# Patient Record
Sex: FEMALE | Race: WHITE | NOT HISPANIC OR LATINO | Employment: STUDENT | URBAN - METROPOLITAN AREA
[De-identification: names, ages, dates, MRNs, and addresses within clinical notes are randomized per-mention and may not be internally consistent; named-entity substitution may affect disease eponyms.]

---

## 2018-12-18 ENCOUNTER — TRANSFERRED RECORDS (OUTPATIENT)
Dept: HEALTH INFORMATION MANAGEMENT | Facility: CLINIC | Age: 23
End: 2018-12-18

## 2019-09-20 ENCOUNTER — TRANSFERRED RECORDS (OUTPATIENT)
Dept: HEALTH INFORMATION MANAGEMENT | Facility: CLINIC | Age: 24
End: 2019-09-20

## 2020-11-05 NOTE — PROGRESS NOTES
"Answers for HPI/ROS submitted by the patient on 11/6/2020   Annual Exam:  Frequency of exercise:: 2-3 days/week  Getting at least 3 servings of Calcium per day:: Yes  Diet:: Regular (no restrictions)  Taking medications regularly:: Yes  Medication side effects:: None  Bi-annual eye exam:: NO  Dental care twice a year:: NO  Sleep apnea or symptoms of sleep apnea:: None  abdominal pain: No  Blood in stool: No  Blood in urine: No  chest pain: No  chills: No  congestion: No  constipation: No  cough: No  diarrhea: No  dizziness: No  ear pain: Yes  eye pain: No  nervous/anxious: No  fever: No  frequency: No  genital sores: No  headaches: Yes  hearing loss: No  heartburn: No  arthralgias: No  joint swelling: No  peripheral edema: No  mood changes: No  myalgias: No  nausea: No    dysuria: No  palpitations: No  Skin sensation changes: No  sore throat: No  urgency: No  rash: No  shortness of breath: No  visual disturbance: No  weakness: No  pelvic pain: No  vaginal bleeding: No  vaginal discharge: No  tenderness: No  breast mass: No  breast discharge: No  Additional concerns today:: Yes  Duration of exercise:: 15-30 minutes   SUBJECTIVE:   CC: Rosy Davidson is an 25 year old woman who presents for preventive health visit.       Patient has been advised of split billing requirements and indicates understanding: {Yes and No:977964}    {Outside tests to abstract? :239913}    {additional problems to add (Optional):841212}        Abuse: Current or Past(Physical, Sexual or Emotional)- No  Do you feel safe in your environment? Yes        Social History     Tobacco Use     Smoking status: Never Smoker   Substance Use Topics     Alcohol use: Not on file     If you drink alcohol do you typically have >3 drinks per day or >7 drinks per week? {ETOH :206570}                     Reviewed orders with patient.  Reviewed health maintenance and updated orders accordingly - {Yes/No:273676::\"Yes\"}  {Chronicprobdata " "(Optional):043548}    {Mammo Decision Support (Optional):696535}    Pertinent mammograms are reviewed under the imaging tab.  History of abnormal Pap smear: {PAP HX:615059}     Reviewed and updated as needed this visit by clinical staff                 Reviewed and updated as needed this visit by Provider                {HISTORY OPTIONS (Optional):904786}    ROS:  { :719331}    OBJECTIVE:   There were no vitals taken for this visit.  EXAM:  {Exam Choices:194096}    {Diagnostic Test Results (Optional):986140::\"Diagnostic Test Results:\",\"Labs reviewed in Epic\"}    ASSESSMENT/PLAN:   {Diag Picklist:160022}    Patient has been advised of split billing requirements and indicates understanding: {YES / NO:447451::\"Yes\"}  COUNSELING:   {FEMALE COUNSELING MESSAGES:993467::\"Reviewed preventive health counseling, as reflected in patient instructions\"}    There is no height or weight on file to calculate BMI.    {Weight Management Plan (ACO) Complete if BMI is abnormal-  Ages 18-64  BMI >24.9.  Age 65+ with BMI <23 or >30 (Optional):811902}    She reports that she has never smoked. She does not have any smokeless tobacco history on file.      Counseling Resources:  ATP IV Guidelines  Pooled Cohorts Equation Calculator  Breast Cancer Risk Calculator  BRCA-Related Cancer Risk Assessment: FHS-7 Tool  FRAX Risk Assessment  ICSI Preventive Guidelines  Dietary Guidelines for Americans, 2010  USDA's MyPlate  ASA Prophylaxis  Lung CA Screening    TERRY Keith [facility]      "

## 2020-11-05 NOTE — PATIENT INSTRUCTIONS
Rosy,     It was nice meeting you today.   I will be in touch with you regarding your results. Please let me know if you have any additional questions or concerns.       TERRY Keith CNP  Questions or concerns please feel free to send me a Socialb[employer] message or call me  Phone : [phone]      Preventive Health Recommendations  Female Ages 21 to 25     Yearly exam:     See your health care provider every year in order to  o Review health changes.   o Discuss preventive care.    o Review your medicines if your doctor has prescribed any.      You should be tested each year for STDs (sexually transmitted diseases).       Talk to your provider about how often you should have cholesterol testing.      Get a Pap test every three years. If you have an abnormal result, your doctor may have you test more often.      If you are at risk for diabetes, you should have a diabetes test (fasting glucose).     Shots:     Get a flu shot each year.     Get a tetanus shot every 10 years.     Consider getting the shot (vaccine) that prevents cervical cancer (Gardasil).    Nutrition:     Eat at least 5 servings of fruits and vegetables each day.    Eat whole-grain bread, whole-wheat pasta and brown rice instead of white grains and rice.    Get adequate Calcium and Vitamin D.     Lifestyle    Exercise at least 150 minutes a week each week (30 minutes a day, 5 days a week). This will help you control your weight and prevent disease.    Limit alcohol to one drink per day.    No smoking.     Wear sunscreen to prevent skin cancer.    See your dentist every six months for an exam and cleaning.  Preventive Health Recommendations  Female Ages 21 to 25     Yearly exam:   See your health care provider every year in order to  Review health changes.   Discuss preventive care.    Review your medicines if your doctor has prescribed any.    You should be tested each year for STDs (sexually transmitted diseases).     Talk to your provider about  how often you should have cholesterol testing.    Get a Pap test every three years. If you have an abnormal result, your doctor may have you test more often.    If you are at risk for diabetes, you should have a diabetes test (fasting glucose).     Shots:   Get a flu shot each year.   Get a tetanus shot every 10 years.   Consider getting the shot (vaccine) that prevents cervical cancer (Gardasil).    Nutrition:   Eat at least 5 servings of fruits and vegetables each day.  Eat whole-grain bread, whole-wheat pasta and brown rice instead of white grains and rice.  Get adequate Calcium and Vitamin D.     Lifestyle  Exercise at least 150 minutes a week each week (30 minutes a day, 5 days a week). This will help you control your weight and prevent disease.  Limit alcohol to one drink per day.  No smoking.   Wear sunscreen to prevent skin cancer.  See your dentist every six months for an exam and cleaning.

## 2020-11-06 ENCOUNTER — TELEPHONE (OUTPATIENT)
Dept: FAMILY MEDICINE | Facility: OTHER | Age: 25
End: 2020-11-06

## 2020-11-06 ENCOUNTER — OFFICE VISIT (OUTPATIENT)
Dept: FAMILY MEDICINE | Facility: OTHER | Age: 25
End: 2020-11-06
Payer: COMMERCIAL

## 2020-11-06 VITALS
TEMPERATURE: 98.3 F | HEIGHT: 66 IN | OXYGEN SATURATION: 100 % | SYSTOLIC BLOOD PRESSURE: 114 MMHG | DIASTOLIC BLOOD PRESSURE: 64 MMHG | HEART RATE: 88 BPM | RESPIRATION RATE: 16 BRPM | BODY MASS INDEX: 31.98 KG/M2 | WEIGHT: 199 LBS

## 2020-11-06 DIAGNOSIS — N76.0 BACTERIAL VAGINITIS: Primary | ICD-10-CM

## 2020-11-06 DIAGNOSIS — Z12.4 SCREENING FOR MALIGNANT NEOPLASM OF CERVIX: ICD-10-CM

## 2020-11-06 DIAGNOSIS — Z00.00 ROUTINE GENERAL MEDICAL EXAMINATION AT A HEALTH CARE FACILITY: Primary | ICD-10-CM

## 2020-11-06 DIAGNOSIS — Z11.59 NEED FOR HEPATITIS C SCREENING TEST: ICD-10-CM

## 2020-11-06 DIAGNOSIS — Z11.4 SCREENING FOR HIV (HUMAN IMMUNODEFICIENCY VIRUS): ICD-10-CM

## 2020-11-06 DIAGNOSIS — Z13.29 SCREENING FOR THYROID DISORDER: ICD-10-CM

## 2020-11-06 DIAGNOSIS — R07.9 CHEST PAIN, UNSPECIFIED TYPE: ICD-10-CM

## 2020-11-06 DIAGNOSIS — J35.1 TONSILLAR HYPERTROPHY: ICD-10-CM

## 2020-11-06 DIAGNOSIS — B96.89 BACTERIAL VAGINITIS: Primary | ICD-10-CM

## 2020-11-06 DIAGNOSIS — Z11.3 SCREENING FOR STDS (SEXUALLY TRANSMITTED DISEASES): ICD-10-CM

## 2020-11-06 LAB
SPECIMEN SOURCE: ABNORMAL
TSH SERPL DL<=0.005 MIU/L-ACNC: 1.83 MU/L (ref 0.4–4)
WET PREP SPEC: ABNORMAL

## 2020-11-06 PROCEDURE — 36415 COLL VENOUS BLD VENIPUNCTURE: CPT | Performed by: NURSE PRACTITIONER

## 2020-11-06 PROCEDURE — 87591 N.GONORRHOEAE DNA AMP PROB: CPT | Performed by: NURSE PRACTITIONER

## 2020-11-06 PROCEDURE — 87210 SMEAR WET MOUNT SALINE/INK: CPT | Performed by: NURSE PRACTITIONER

## 2020-11-06 PROCEDURE — 84443 ASSAY THYROID STIM HORMONE: CPT | Performed by: NURSE PRACTITIONER

## 2020-11-06 PROCEDURE — 93000 ELECTROCARDIOGRAM COMPLETE: CPT | Performed by: NURSE PRACTITIONER

## 2020-11-06 PROCEDURE — 90471 IMMUNIZATION ADMIN: CPT | Performed by: NURSE PRACTITIONER

## 2020-11-06 PROCEDURE — 99385 PREV VISIT NEW AGE 18-39: CPT | Mod: 25 | Performed by: NURSE PRACTITIONER

## 2020-11-06 PROCEDURE — 87491 CHLMYD TRACH DNA AMP PROBE: CPT | Performed by: NURSE PRACTITIONER

## 2020-11-06 PROCEDURE — 90686 IIV4 VACC NO PRSV 0.5 ML IM: CPT | Performed by: NURSE PRACTITIONER

## 2020-11-06 PROCEDURE — 99213 OFFICE O/P EST LOW 20 MIN: CPT | Mod: 25 | Performed by: NURSE PRACTITIONER

## 2020-11-06 RX ORDER — METRONIDAZOLE 500 MG/1
500 TABLET ORAL 2 TIMES DAILY
Qty: 14 TABLET | Refills: 0 | Status: SHIPPED | OUTPATIENT
Start: 2020-11-06 | End: 2020-11-13

## 2020-11-06 RX ORDER — COPPER 313.4 MG/1
1 INTRAUTERINE DEVICE INTRAUTERINE ONCE
COMMUNITY
Start: 2019-08-06 | End: 2021-11-18

## 2020-11-06 SDOH — HEALTH STABILITY: MENTAL HEALTH: HOW OFTEN DO YOU HAVE 6 OR MORE DRINKS ON ONE OCCASION?: NOT ASKED

## 2020-11-06 SDOH — HEALTH STABILITY: MENTAL HEALTH: HOW OFTEN DO YOU HAVE A DRINK CONTAINING ALCOHOL?: NOT ASKED

## 2020-11-06 SDOH — HEALTH STABILITY: MENTAL HEALTH: HOW MANY STANDARD DRINKS CONTAINING ALCOHOL DO YOU HAVE ON A TYPICAL DAY?: NOT ASKED

## 2020-11-06 ASSESSMENT — ENCOUNTER SYMPTOMS
DIZZINESS: 0
CONSTIPATION: 0
EYE PAIN: 0
PARESTHESIAS: 0
SHORTNESS OF BREATH: 0
ABDOMINAL PAIN: 0
HEMATURIA: 0
HEMATOCHEZIA: 0
CHILLS: 0
HEARTBURN: 0
PALPITATIONS: 0
DIARRHEA: 0
FEVER: 0
HEADACHES: 1
FREQUENCY: 0
SORE THROAT: 0
COUGH: 0
MYALGIAS: 0
BREAST MASS: 0
ARTHRALGIAS: 0
NERVOUS/ANXIOUS: 0
NAUSEA: 0
WEAKNESS: 0
JOINT SWELLING: 0
DYSURIA: 0

## 2020-11-06 ASSESSMENT — MIFFLIN-ST. JEOR: SCORE: 1665.44

## 2020-11-06 NOTE — PROGRESS NOTES
SUBJECTIVE:   CC: Rosy Davidson is an 25 year old woman who presents for preventive health visit.       Patient has been advised of split billing requirements and indicates understanding: Yes  Healthy Habits:     Getting at least 3 servings of Calcium per day:  Yes    Bi-annual eye exam:  NO    Dental care twice a year:  NO    Sleep apnea or symptoms of sleep apnea:  None    Diet:  Regular (no restrictions)    Frequency of exercise:  2-3 days/week    Duration of exercise:  15-30 minutes    Taking medications regularly:  Yes    Medication side effects:  None    PHQ-2 Total Score: 0    Additional concerns today:  Yes      Pap smear done on this date: August 2019  (approximately), by this group: [facility], results were Normal.   Thinks she had Tdap in 2018     Lymph nodes on the right side and tonsil on the right side is swollen this is ongoing for the last couple years.   Sharp shooting pains in the chest every once and awhile.       Today's PHQ-2 Score:   PHQ-2 ( 1999 Pfizer) 11/6/2020   Q1: Little interest or pleasure in doing things 0   Q2: Feeling down, depressed or hopeless 0   PHQ-2 Score 0   Q1: Little interest or pleasure in doing things Not at all   Q2: Feeling down, depressed or hopeless Not at all   PHQ-2 Score 0       Abuse: Current or Past (Physical, Sexual or Emotional) - No  Do you feel safe in your environment? Yes        Social History     Tobacco Use     Smoking status: Never Smoker     Smokeless tobacco: Never Used   Substance Use Topics     Alcohol use: Yes     If you drink alcohol do you typically have >3 drinks per day or >7 drinks per week? Not applicable    Alcohol Use 11/6/2020   Prescreen: >3 drinks/day or >7 drinks/week? No       Reviewed orders with patient.  Reviewed health maintenance and updated orders accordingly - Yes  Lab work is in process    Mammogram not appropriate for this patient based on age.    Pertinent mammograms are reviewed under the imaging tab.  History of abnormal  "Pap smear: NO - age 21-29 PAP every 3 years recommended     Reviewed and updated as needed this visit by clinical staff  Tobacco  Allergies  Meds              Reviewed and updated as needed this visit by Provider                No past medical history on file.   No past surgical history on file.  OB History   No obstetric history on file.       Review of Systems   Constitutional: Negative for chills and fever.   HENT: Positive for ear pain. Negative for congestion, hearing loss and sore throat.    Eyes: Negative for pain and visual disturbance.   Respiratory: Negative for cough and shortness of breath.    Cardiovascular: Negative for chest pain, palpitations and peripheral edema.   Gastrointestinal: Negative for abdominal pain, constipation, diarrhea, heartburn, hematochezia and nausea.   Breasts:  Negative for tenderness, breast mass and discharge.   Genitourinary: Negative for dysuria, frequency, genital sores, hematuria, pelvic pain, urgency, vaginal bleeding and vaginal discharge.   Musculoskeletal: Negative for arthralgias, joint swelling and myalgias.   Skin: Negative for rash.   Neurological: Positive for headaches. Negative for dizziness, weakness and paresthesias.   Psychiatric/Behavioral: Negative for mood changes. The patient is not nervous/anxious.         OBJECTIVE:   /64   Pulse 88   Temp 98.3  F (36.8  C) (Temporal)   Resp 16   Ht 1.67 m (5' 5.75\")   Wt 90.3 kg (199 lb)   LMP 10/09/2020   SpO2 100%   BMI 32.36 kg/m    Physical Exam  HENT:      Right Ear: Tympanic membrane, ear canal and external ear normal.      Left Ear: Tympanic membrane, ear canal and external ear normal.      Ears:      Comments: Clear effusions present bilaterally      Nose: Nose normal.      Mouth/Throat:      Mouth: Mucous membranes are moist.      Pharynx: Oropharynx is clear. No pharyngeal swelling, oropharyngeal exudate, posterior oropharyngeal erythema or uvula swelling.      Tonsils: No tonsillar exudate " or tonsillar abscesses. 3+ on the right.   Chest:      Breasts:         Right: Normal.         Left: Normal.   Lymphadenopathy:      Head:      Right side of head: No submental, submandibular, tonsillar, preauricular, posterior auricular or occipital adenopathy.      Left side of head: No submental, submandibular, tonsillar, preauricular, posterior auricular or occipital adenopathy.      Cervical: Cervical adenopathy (Mild on right side) present.      Upper Body:      Right upper body: No supraclavicular or axillary adenopathy.      Left upper body: No supraclavicular or axillary adenopathy.   Neurological:      Mental Status: She is alert and oriented to person, place, and time.   Psychiatric:         Attention and Perception: Attention and perception normal.         Mood and Affect: Mood and affect normal.         Speech: Speech normal.         Behavior: Behavior normal. Behavior is cooperative.         Thought Content: Thought content normal.         Cognition and Memory: Cognition and memory normal.         Judgment: Judgment normal.           Diagnostic Test Results:  Labs reviewed in Epic  Results for orders placed or performed in visit on 11/06/20 (from the past 24 hour(s))   Wet prep    Specimen: Vagina   Result Value Ref Range    Specimen Description Vagina     Wet Prep No Trichomonas seen     Wet Prep Clue cells seen  Few   (A)     Wet Prep No yeast seen     Wet Prep WBC'S seen  Rare          ASSESSMENT/PLAN:   1. Routine general medical examination at a health care facility  - Updated   - [facility] from [place] for pap and labs.   - Hold off on Pap this year given she got one last year, will update when records come in.   - IUD in place, copper IUD.     2. Screening for STDs (sexually transmitted diseases)  - Labs today for yearly screening.     - Chlamydia trachomatis PCR  - Neisseria gonorrhoeae PCR  - Wet prep    3. Screening for HIV (human immunodeficiency virus)  - Not needed at this time.     4. Need  "for hepatitis C screening test  - Not needed at this time.     5. Screening for malignant neoplasm of cervix  - See above.   - Will put in records once comes in from [place].     6. Tonsillar hypertrophy  - Enlarged tonsil on the right side, significantly enlarged +3. Able to breath with good airway movement. Snoring at night and some right sided lymphedema for the last couple of years likely from hypertrophy of tonsils.   - Recommend ENT referral.   - OTOLARYNGOLOGY REFERRAL    7. Chest pain, unspecified type  Intermittent pain in the right and left side, could be related to anxiety per patient would like EKG to evaluate. Could consider Holter if needed. No shortness of breath or palpitations.   - Discussed monitoring closely and follow up if worsening.   - EKG 12-lead complete w/read - Clinics  - Holter Monitor 48 hour Adult Pediatric; Future    8. Screening for thyroid disorder    - TSH with free T4 reflex    Patient has been advised of split billing requirements and indicates understanding: Yes  COUNSELING:  Reviewed preventive health counseling, as reflected in patient instructions       Regular exercise       Healthy diet/nutrition       Immunizations    Vaccinated for: Influenza          Estimated body mass index is 32.36 kg/m  as calculated from the following:    Height as of this encounter: 1.67 m (5' 5.75\").    Weight as of this encounter: 90.3 kg (199 lb).    Weight management plan: Discussed healthy diet and exercise guidelines    She reports that she has never smoked. She has never used smokeless tobacco.      Counseling Resources:  ATP IV Guidelines  Pooled Cohorts Equation Calculator  Breast Cancer Risk Calculator  BRCA-Related Cancer Risk Assessment: FHS-7 Tool  FRAX Risk Assessment  ICSI Preventive Guidelines  Dietary Guidelines for Americans, 2010  USDA's MyPlate  ASA Prophylaxis  Lung CA Screening    Laura Werner, [facility]  "

## 2020-11-06 NOTE — TELEPHONE ENCOUNTER
Called patient and spoke to her about her wet prep results.     Discussed started Flagyl for BV.       Discussed EKG which was normal. Will hold off on holter.     She will also monitor her anxiety and call back for virtual if it gets worse with NCLEX coming up.       TERRY Keith CNP  Questions or concerns please feel free to send me a MOBITRAC message or call me  Phone : [phone]

## 2020-11-06 NOTE — LETTER
November 9, 2020      Rosy Davidson  [id number] [address] [place] [id number]        Dear ,    We are writing to inform you of your test results.    Your test results fall within the expected range(s) or are normal. Please continue with current treatment plan.    Resulted Orders   Chlamydia trachomatis PCR   Result Value Ref Range    Specimen Description Urine     Chlamydia Trachomatis PCR Negative NEG^Negative      Comment:      Negative for C. trachomatis rRNA by transcription mediated amplification.  A negative result by transcription mediated amplification does not preclude   the presence of C. trachomatis infection because results are dependent on   proper and adequate collection, absence of inhibitors, and sufficient rRNA to   be detected.     Neisseria gonorrhoeae PCR   Result Value Ref Range    Specimen Descrip Urine     N Gonorrhea PCR Negative NEG^Negative      Comment:      Negative for N. gonorrhoeae rRNA by transcription mediated amplification.  A negative result by transcription mediated amplification does not preclude   the presence of N. gonorrhoeae infection because results are dependent on   proper and adequate collection, absence of inhibitors, and sufficient rRNA to   be detected.     Wet prep   Result Value Ref Range    Specimen Description Vagina     Wet Prep No Trichomonas seen     Wet Prep Clue cells seen  Few   (A)     Wet Prep No yeast seen     Wet Prep WBC'S seen  Rare      TSH with free T4 reflex   Result Value Ref Range    TSH 1.83 0.40 - 4.00 mU/L       If you have any questions or concerns, please call the clinic at the number listed above.       Sincerely,        TERRY Keith CNP

## 2020-11-08 LAB
C TRACH DNA SPEC QL NAA+PROBE: NEGATIVE
N GONORRHOEA DNA SPEC QL NAA+PROBE: NEGATIVE
SPECIMEN SOURCE: NORMAL
SPECIMEN SOURCE: NORMAL

## 2020-11-20 NOTE — PROGRESS NOTES
ENT Consultation      Rosy Davidson is a 25 year old female who is seen in consultation at the request of Laura Werner CNP.     Chief Complaint - Tonsillitis    History of Present Illness - Rosy Davidson is a 25 year old female with concern from her primary care provider regarding asymmetric tonsil.  Apparently this young lady has had multiple episodes of strep tonsillitis that she was much younger but nothing recently.  She was found to have a right tonsil that is quite enlarged by primary care provider for his last few months.  She gets monthly soreness in her throat more on the right than the left but that is about it.  That has been ongoing for a long time.  She also felt some swollen lymph glands in the right side of the neck.  Denies any constitutional symptoms fever chills malaise fatigue weight loss.  No dyspnea no dysphagia no odynophagia.  No hemoptysis.  She denies any halitosis or tonsil stones.      Past Medical History -   Patient Active Problem List   Diagnosis     Raynaud's phenomenon       Current Medications -   Current Outpatient Medications:      paragard intrauterine copper device, 1 each by Intrauterine route once, Disp: , Rfl:      Pseudoephedrine-APAP-DM (DAYQUIL OR), , Disp: , Rfl:     Allergies - No Known Allergies    Social History -   Social History     Socioeconomic History     Marital status: Single     Spouse name: Not on file     Number of children: Not on file     Years of education: Not on file     Highest education level: Not on file   Occupational History     Not on file   Social Needs     Financial resource strain: Not on file     Food insecurity     Worry: Not on file     Inability: Not on file     Transportation needs     Medical: Not on file     Non-medical: Not on file   Tobacco Use     Smoking status: Never Smoker     Smokeless tobacco: Never Used   Substance and Sexual Activity     Alcohol use: Yes     Drug use: Never     Sexual activity: Not on file   Lifestyle      "Physical activity     Days per week: Not on file     Minutes per session: Not on file     Stress: Not on file   Relationships     Social connections     Talks on phone: Not on file     Gets together: Not on file     Attends [employer]orationist service: Not on file     Active member of club or organization: Not on file     Attends meetings of clubs or organizations: Not on file     Relationship status: Not on file     Intimate partner violence     Fear of current or ex partner: Not on file     Emotionally abused: Not on file     Physically abused: Not on file     Forced sexual activity: Not on file   Other Topics Concern     Not on file   Social History Narrative     Not on file       Family History - No family history on file.    Review of Systems - As per HPI and PMHx, otherwise 10+ comprehensive system review is negative.    Physical Exam    Vital signs:                         Estimated body mass index is 32.36 kg/m  as calculated from the following:    Height as of 11/6/20: 1.67 m (5' 5.75\").    Weight as of 11/6/20: 90.3 kg (199 lb).        General - The patient is in no distress.  Alert and oriented x3, answers questions and cooperates with examination appropriately.     Voice and Breathing - The patient was breathing comfortably without the use of accessory muscles. There was no wheezing, stridor, or stertor.  The patients voice was clear and strong.    Eyes - Extraocular movements intact. Sclera were not icteric or injected, conjunctiva were pink and moist.    Neurologic - Cranial nerves II-XII are grossly intact. Specifically, the facial nerve is intact, House-Brackmann grade 1 of 6.     Nose - No significant external deformity.  Nasal mucosa is pink and moist with no abnormal mucus.  The septum was midline, turbinates are of normal size and position.  No polyps, masses, or purulence.    Mouth - Examination of the oral cavity showed pink, healthy oral mucosa. No lesions or ulcerations noted.  The tongue was mobile " and protrudes midline.    Oropharynx - The walls of the oropharynx were smooth, pink, moist, symmetric, and had no lesions or ulcerations.  The tonsils were 3+ on the right however smooth without any crypts tonsil stones or exudates or erythema and 1-2+ left. The uvula was midline and the palate raised symmetrically.     Ears - The auricles appeared normal. The external auditory canals were nonedematous and nonerythematous. The tympanic membranes are normal in appearance, bony landmarks are intact.  No retraction, perforation, or masses.  No fluid or purulence was seen in the external canal or the middle ear.     Neck -  Palpation of the occipital, submental, submandibular, internal jugular chain, and supraclavicular nodes did demonstrate on the right side palpable perhaps 1 and 1/2 cm level 2 nontender node and then level 3 large in the neck on the right side also about 1 to 1/2 cm palpable mobile nontender nodes. The parotid glands were without masses. Palpation of the thyroid was soft and smooth, with no nodules or goiter appreciated.  The trachea was midline.      A/P - Rosy Davidson is a 25 year old female with asymmetric tonsils much larger on the right and left and some local right neck lymphadenopathy.  Patient certainly is informed about tonsil asymmetry and significance of it.  Patient did endorse history of food getting Gardasil vaccine.  She is low likelihood of this being a malignancy but we discussed this is a small possibility.  We also discussed potentially need to further evaluate with a CT scan of the soft tissues of the neck evaluating lymphadenopathy and the tonsillar bed.  Patient agrees with the plan.  She will see me back next week to discuss results of the scan.     Serge Chamberlain M.D.  Otolaryngology  [facility]

## 2020-11-23 ENCOUNTER — HOSPITAL ENCOUNTER (OUTPATIENT)
Dept: CT IMAGING | Facility: CLINIC | Age: 25
Discharge: HOME | End: 2020-11-23
Attending: OTOLARYNGOLOGY | Admitting: OTOLARYNGOLOGY
Payer: COMMERCIAL

## 2020-11-23 ENCOUNTER — OFFICE VISIT (OUTPATIENT)
Dept: OTOLARYNGOLOGY | Facility: CLINIC | Age: 25
End: 2020-11-23
Payer: COMMERCIAL

## 2020-11-23 VITALS
BODY MASS INDEX: 32.78 KG/M2 | HEIGHT: 66 IN | DIASTOLIC BLOOD PRESSURE: 66 MMHG | SYSTOLIC BLOOD PRESSURE: 116 MMHG | WEIGHT: 204 LBS

## 2020-11-23 DIAGNOSIS — J35.8 ASYMMETRIC TONSILS: ICD-10-CM

## 2020-11-23 DIAGNOSIS — J35.8 ASYMMETRIC TONSILS: Primary | ICD-10-CM

## 2020-11-23 DIAGNOSIS — R59.1 LYMPHADENOPATHY: ICD-10-CM

## 2020-11-23 PROCEDURE — 250N000009 HC RX 250: Performed by: OTOLARYNGOLOGY

## 2020-11-23 PROCEDURE — 250N000011 HC RX IP 250 OP 636: Performed by: OTOLARYNGOLOGY

## 2020-11-23 PROCEDURE — 99243 OFF/OP CNSLTJ NEW/EST LOW 30: CPT | Performed by: OTOLARYNGOLOGY

## 2020-11-23 PROCEDURE — 70491 CT SOFT TISSUE NECK W/DYE: CPT

## 2020-11-23 RX ORDER — IOPAMIDOL 755 MG/ML
500 INJECTION, SOLUTION INTRAVASCULAR ONCE
Status: COMPLETED | OUTPATIENT
Start: 2020-11-23 | End: 2020-11-23

## 2020-11-23 RX ADMIN — IOPAMIDOL 100 ML: 755 INJECTION, SOLUTION INTRAVENOUS at 10:32

## 2020-11-23 RX ADMIN — SODIUM CHLORIDE 70 ML: 9 INJECTION, SOLUTION INTRAVENOUS at 10:32

## 2020-11-23 ASSESSMENT — MIFFLIN-ST. JEOR: SCORE: 1688.12

## 2020-11-23 NOTE — LETTER
11/23/2020         RE: Rosy Davidson  [id number] [address] [place] [id number]        Dear Colleague,    Thank you for referring your patient, Rosy Davidson, to the [facility]. Please see a copy of my visit note below.    ENT Consultation      Rosy Davidson is a 25 year old female who is seen in consultation at the request of Laura Werner CNP.     Chief Complaint - Tonsillitis    History of Present Illness - Rosy Davidson is a 25 year old female with concern from her primary care provider regarding asymmetric tonsil.  Apparently this young lady has had multiple episodes of strep tonsillitis that she was much younger but nothing recently.  She was found to have a right tonsil that is quite enlarged by primary care provider for his last few months.  She gets monthly soreness in her throat more on the right than the left but that is about it.  That has been ongoing for a long time.  She also felt some swollen lymph glands in the right side of the neck.  Denies any constitutional symptoms fever chills malaise fatigue weight loss.  No dyspnea no dysphagia no odynophagia.  No hemoptysis.  She denies any halitosis or tonsil stones.      Past Medical History -   Patient Active Problem List   Diagnosis     Raynaud's phenomenon       Current Medications -   Current Outpatient Medications:      paragard intrauterine copper device, 1 each by Intrauterine route once, Disp: , Rfl:      Pseudoephedrine-APAP-DM (DAYQUIL OR), , Disp: , Rfl:     Allergies - No Known Allergies    Social History -   Social History     Socioeconomic History     Marital status: Single     Spouse name: Not on file     Number of children: Not on file     Years of education: Not on file     Highest education level: Not on file   Occupational History     Not on file   Social Needs     Financial resource strain: Not on file     Food insecurity     Worry: Not on file     Inability: Not on file     Transportation needs     " Medical: Not on file     Non-medical: Not on file   Tobacco Use     Smoking status: Never Smoker     Smokeless tobacco: Never Used   Substance and Sexual Activity     Alcohol use: Yes     Drug use: Never     Sexual activity: Not on file   Lifestyle     Physical activity     Days per week: Not on file     Minutes per session: Not on file     Stress: Not on file   Relationships     Social connections     Talks on phone: Not on file     Gets together: Not on file     Attends Druze service: Not on file     Active member of club or organization: Not on file     Attends meetings of clubs or organizations: Not on file     Relationship status: Not on file     Intimate partner violence     Fear of current or ex partner: Not on file     Emotionally abused: Not on file     Physically abused: Not on file     Forced sexual activity: Not on file   Other Topics Concern     Not on file   Social History Narrative     Not on file       Family History - No family history on file.    Review of Systems - As per HPI and PMHx, otherwise 10+ comprehensive system review is negative.    Physical Exam    Vital signs:                         Estimated body mass index is 32.36 kg/m  as calculated from the following:    Height as of 11/6/20: 1.67 m (5' 5.75\").    Weight as of 11/6/20: 90.3 kg (199 lb).        General - The patient is in no distress.  Alert and oriented x3, answers questions and cooperates with examination appropriately.     Voice and Breathing - The patient was breathing comfortably without the use of accessory muscles. There was no wheezing, stridor, or stertor.  The patients voice was clear and strong.    Eyes - Extraocular movements intact. Sclera were not icteric or injected, conjunctiva were pink and moist.    Neurologic - Cranial nerves II-XII are grossly intact. Specifically, the facial nerve is intact, House-Brackmann grade 1 of 6.     Nose - No significant external deformity.  Nasal mucosa is pink and moist with no " abnormal mucus.  The septum was midline, turbinates are of normal size and position.  No polyps, masses, or purulence.    Mouth - Examination of the oral cavity showed pink, healthy oral mucosa. No lesions or ulcerations noted.  The tongue was mobile and protrudes midline.    Oropharynx - The walls of the oropharynx were smooth, pink, moist, symmetric, and had no lesions or ulcerations.  The tonsils were 3+ on the right however smooth without any crypts tonsil stones or exudates or erythema and 1-2+ left. The uvula was midline and the palate raised symmetrically.     Ears - The auricles appeared normal. The external auditory canals were nonedematous and nonerythematous. The tympanic membranes are normal in appearance, bony landmarks are intact.  No retraction, perforation, or masses.  No fluid or purulence was seen in the external canal or the middle ear.     Neck -  Palpation of the occipital, submental, submandibular, internal jugular chain, and supraclavicular nodes did demonstrate on the right side palpable perhaps 1 and 1/2 cm level 2 nontender node and then level 3 large in the neck on the right side also about 1 to 1/2 cm palpable mobile nontender nodes. The parotid glands were without masses. Palpation of the thyroid was soft and smooth, with no nodules or goiter appreciated.  The trachea was midline.      A/P - Rosy Davidson is a 25 year old female with asymmetric tonsils much larger on the right and left and some local right neck lymphadenopathy.  Patient certainly is informed about tonsil asymmetry and significance of it.  Patient did endorse history of food getting Gardasil vaccine.  She is low likelihood of this being a malignancy but we discussed this is a small possibility.  We also discussed potentially need to further evaluate with a CT scan of the soft tissues of the neck evaluating lymphadenopathy and the tonsillar bed.  Patient agrees with the plan.  She will see me back next week to discuss  results of the scan.     Serge Chamberlain M.D.  Otolaryngology  [facility] [place]              Again, thank you for allowing me to participate in the care of your patient.        Sincerely,        Serge Chamberlain MD, MD

## 2020-11-27 NOTE — PROGRESS NOTES
History of Present Illness - Rosy Davidson is a 25 year old female presents for evaluation of asymmetric tonsils.  She certainly does have 1 enlarged right upper pole of the tonsil on the left side.  Otherwise the tonsils appear to be clear.  No exudates no other issues been noted.  CT scan of the soft tissues of the neck was obtained to further evaluate tonsillar bed status of lymphadenopathy in the neck to help us in the decision-making process in regard to further steps selection to address the asymmetry.  Results of CT scan:    CT OF THE NECK WITH CONTRAST  11/23/2020 10:41 AM      COMPARISON: None     HISTORY: Neck mass, solitary, afebrile.  Asymmetric tonsils.  Lymphadenopathy.     TECHNIQUE:  Axial CT images of the neck were acquired after the  intravenous administration of 80mL Isovue 370 nonionic iodinated  contrast material. Coronal reconstructions were created.     FINDINGS: There is mild prominence in size of the palatine tonsils  bilaterally (right more prominent than left). There is no definite  evidence for intrinsic abnormality of any of the lymphoid tissues of  Waldeyer's ring. If there is clinical suspicion of a small mucosal  space lesion, direct visualization is recommended.     There are scattered mildly prominent anterior and posterior cervical  chain lymph nodes in both sides of the neck that could be reactive in  nature.     There are no fluid collections or abscesses in the neck. The salivary  glands are unremarkable. Other than mild prominence of the lymphoid  tissues of Waldeyer's ring, no mucosal space abnormalities  are  identified. The thyroid gland is within normal limits. The lung apices  are clear. There is no sinusitis or mastoiditis. The cervical arterial  vasculature is within normal limits.                                                                      IMPRESSION:  1. Scattered mildly prominent lymph nodes in the anterior and  posterior cervical chains at both sides of  the neck that could be  reactive in nature.  2. Mild prominence of the lymphoid tissues of Waldeyer's ring with  asymmetric prominence in size of the right palatine tonsil.  3. Otherwise, normal soft tissue neck CT.       Past Medical History - No past medical history on file.    Current Medications -   Current Outpatient Medications:      paragard intrauterine copper device, 1 each by Intrauterine route once, Disp: , Rfl:      Pseudoephedrine-APAP-DM (DAYQUIL OR), , Disp: , Rfl:     Allergies - No Known Allergies    Social History -   Social History     Socioeconomic History     Marital status: Single     Spouse name: Not on file     Number of children: Not on file     Years of education: Not on file     Highest education level: Not on file   Occupational History     Not on file   Social Needs     Financial resource strain: Not on file     Food insecurity     Worry: Not on file     Inability: Not on file     Transportation needs     Medical: Not on file     Non-medical: Not on file   Tobacco Use     Smoking status: Never Smoker     Smokeless tobacco: Never Used   Substance and Sexual Activity     Alcohol use: Yes     Drug use: Never     Sexual activity: Not on file   Lifestyle     Physical activity     Days per week: Not on file     Minutes per session: Not on file     Stress: Not on file   Relationships     Social connections     Talks on phone: Not on file     Gets together: Not on file     Attends Buddhism service: Not on file     Active member of club or organization: Not on file     Attends meetings of clubs or organizations: Not on file     Relationship status: Not on file     Intimate partner violence     Fear of current or ex partner: Not on file     Emotionally abused: Not on file     Physically abused: Not on file     Forced sexual activity: Not on file   Other Topics Concern     Not on file   Social History Narrative     Not on file       Family History - No family history on file.    Review of Systems  - As per HPI and PMHx, otherwise review of system review of the head and neck negative. Otherwise 10+ review systems negative.    Physical Exam  There were no vitals taken for this visit.  BMI: There is no height or weight on file to calculate BMI.    General - The patient is well nourished and well developed, and appears to have good nutritional status.  Alert and oriented to person and place, answers questions and cooperates with examination appropriately.    SKIN - No suspicious lesions or rashes.  Respiration - No respiratory distress.  Head and Face - Normocephalic and atraumatic, with no gross asymmetry noted of the contour of the facial features.  The facial nerve is intact, with strong symmetric movements.    Voice and Breathing - The patient was breathing comfortably without the use of accessory muscles. The patients voice was clear and strong, and had appropriate pitch and quality.    Ears - Bilateral pinna and EACs with normal appearing overlying skin. Tympanic membrane intact with good mobility on pneumatic otoscopy bilaterally. Bony landmarks of the ossicular chain are normal. The tympanic membranes are normal in appearance. No retraction, perforation, or masses.  No fluid or purulence was seen in the external canal or the middle ear.     Eyes - Extraocular movements intact.  Sclera were not icteric or injected, conjunctiva were pink and moist.    Mouth - Examination of the oral cavity showed pink, healthy oral mucosa. No lesions or ulcerations noted.  The tongue was mobile and midline, and the dentition were in good condition.      Throat - The walls of the oropharynx were smooth, pink, moist, symmetric, and had no lesions or ulcerations.  The tonsillar pillars and soft palate were symmetric. Tonsils are   3+ on the right mainly due to the prominent upper pole and 2+ on the left without exudates without any other suspicious mucosal changes.. The uvula was midline on elevation.    Neck - Normal midline  "excursion of the laryngotracheal complex during swallowing.  Full range of motion on passive movement.  Palpation of the occipital, submental, submandibular, internal jugular chain, and supraclavicular nodes did not demonstrate any abnormal lymph nodes or masses.  The carotid pulse was palpable bilaterally.  Palpation of the thyroid was soft and smooth, with no nodules or goiter appreciated.  The trachea was mobile and midline.    Nose - External contour is symmetric, no gross deflection or scars.  Nasal mucosa is pink and moist with no abnormal mucus.  The septum was midline and non-obstructive, turbinates of normal size and position.  No polyps, masses, or purulence noted on examination.    Neuro - Nonfocal neuro exam is normal, CN 2 through 12 intact, normal gait and muscle tone.      Performed in clinic today:  No procedures preformed in clinic today          A/P - Rosy Davidson is a 25 year old female Patient presents with:  Follow Up: tonsils        Today we have a thorough discussion of the patient in regard to findings on the CT scan current status of her tonsils.  She understands the small chance of the asymmetry being significance.  She understands that the CT scan did not appear to show any suspicious findings.  At this point she wishes to observe this carefully but conservatively.  Considering the asymmetry presents no symptomatology to her we agree with this decision for now.  She is instructed that if there are any changes whatsoever she perceives discomfort foreign body sensation hemoptysis she is to return right away.  If she feels any other \"lumps\" in the neck she is to come back right away otherwise we will follow-up in 3 months.      Serge Chamberlain MD      "

## 2020-11-30 ENCOUNTER — OFFICE VISIT (OUTPATIENT)
Dept: OTOLARYNGOLOGY | Facility: CLINIC | Age: 25
End: 2020-11-30
Payer: COMMERCIAL

## 2020-11-30 VITALS
HEIGHT: 66 IN | BODY MASS INDEX: 32.78 KG/M2 | WEIGHT: 204 LBS | SYSTOLIC BLOOD PRESSURE: 118 MMHG | DIASTOLIC BLOOD PRESSURE: 66 MMHG

## 2020-11-30 DIAGNOSIS — J35.8 ASYMMETRIC TONSILS: Primary | ICD-10-CM

## 2020-11-30 PROCEDURE — 99214 OFFICE O/P EST MOD 30 MIN: CPT | Performed by: OTOLARYNGOLOGY

## 2020-11-30 ASSESSMENT — MIFFLIN-ST. JEOR: SCORE: 1688.12

## 2020-12-06 ENCOUNTER — HEALTH MAINTENANCE LETTER (OUTPATIENT)
Age: 25
End: 2020-12-06

## 2021-03-01 ENCOUNTER — IMMUNIZATION (OUTPATIENT)
Dept: NURSING | Facility: CLINIC | Age: 26
End: 2021-03-01
Payer: COMMERCIAL

## 2021-03-01 PROCEDURE — 0001A PR COVID VAC PFIZER DIL RECON 30 MCG/0.3 ML IM: CPT

## 2021-03-01 PROCEDURE — 91300 PR COVID VAC PFIZER DIL RECON 30 MCG/0.3 ML IM: CPT

## 2021-03-22 ENCOUNTER — IMMUNIZATION (OUTPATIENT)
Dept: NURSING | Facility: CLINIC | Age: 26
End: 2021-03-22
Attending: INTERNAL MEDICINE
Payer: COMMERCIAL

## 2021-03-22 PROCEDURE — 0002A PR COVID VAC PFIZER DIL RECON 30 MCG/0.3 ML IM: CPT

## 2021-03-22 PROCEDURE — 91300 PR COVID VAC PFIZER DIL RECON 30 MCG/0.3 ML IM: CPT

## 2021-05-21 NOTE — PROGRESS NOTES
SUBJECTIVE:   CC: Rosy Davidson is an 26 year old woman who presents for preventive health visit.     {Split Bill scripting  The purpose of this visit is to discuss your medical history and prevent health problems before you are sick. You may be responsible for a co-pay, coinsurance, or deductible if your visit today includes services such as checking on a sore throat, having an x-ray or lab test, or treating and evaluating a new or existing condition :143019}  Patient has been advised of split billing requirements and indicates understanding: {Yes and No:545855}  HPI  {Add if <65 person on Medicare  - Required Questions (Optional):412682}  {Outside tests to abstract? :818455}    {additional problems to add (Optional):119508}    Today's PHQ-2 Score:   PHQ-2 ( 1999 Pfizer) 11/6/2020   Q1: Little interest or pleasure in doing things 0   Q2: Feeling down, depressed or hopeless 0   PHQ-2 Score 0   Q1: Little interest or pleasure in doing things Not at all   Q2: Feeling down, depressed or hopeless Not at all   PHQ-2 Score 0       Abuse: Current or Past (Physical, Sexual or Emotional) - { :114893}  Do you feel safe in your environment? { :975057}    Have you ever done Advance Care Planning? (For example, a Health Directive, POLST, or a discussion with a medical provider or your loved ones about your wishes): { :160850}    Social History     Tobacco Use     Smoking status: Never Smoker     Smokeless tobacco: Never Used   Substance Use Topics     Alcohol use: Yes     {Rooming Staff- Complete this question if Prescreen response is not shown below for today's visit. If you drink alcohol do you typically have >3 drinks per day or >7 drinks per week? (Optional):643037}    Alcohol Use 11/6/2020   Prescreen: >3 drinks/day or >7 drinks/week? No   Prescreen: >3 drinks/day or >7 drinks/week? -   {add AUDIT responses (Optional) (A score of 7 for adult men is an indication of hazardous drinking; a score of 8 or more is an  "indication of an alcohol use disorder.  A score of 7 or more for adult women is an indication of hazardous drinking or an alchohol use disorder):000108}    Reviewed orders with patient.  Reviewed health maintenance and updated orders accordingly - { :848501::\"Yes\"}  {Chronicprobdata (optional):776522}    Breast Cancer Screening:  Any new diagnosis of family breast, ovarian, or bowel cancer? {Yes_Link to Screening / No:954730}    FSH-7: No flowsheet data found.  {If any of the questions to the BCRA (FHS-7) are answered yes, consider ordering referral for genetic counseling (Optional) :344581::\"click delete button to remove this line now\"}  {AMB Mammogram Decision Support (Optional) :299245}  Pertinent mammograms are reviewed under the imaging tab.    History of abnormal Pap smear: { :284960}     Reviewed and updated as needed this visit by clinical staff                 Reviewed and updated as needed this visit by Provider                {HISTORY OPTIONS (Optional):666700}    Review of Systems  {FEMALE ROS (Optional):717891}     OBJECTIVE:   There were no vitals taken for this visit.  Physical Exam  {Exam Choices (Optional):818052}    {Diagnostic Test Results (Optional):641862::\"Diagnostic Test Results:\",\"Labs reviewed in Epic\"}    ASSESSMENT/PLAN:   {Diag Picklist:951003}    Patient has been advised of split billing requirements and indicates understanding: {YES / NO:572474::\"Yes\"}  COUNSELING:  {FEMALE COUNSELING MESSAGES:217234::\"Reviewed preventive health counseling, as reflected in patient instructions\"}    Estimated body mass index is 33.18 kg/m  as calculated from the following:    Height as of 11/30/20: 1.67 m (5' 5.75\").    Weight as of 11/30/20: 92.5 kg (204 lb).    {Weight Management Plan (ACO) Complete if BMI is abnormal-  Ages 18-64  BMI >24.9.  Age 65+ with BMI <23 or >30 (Optional):709217}    She reports that she has never smoked. She has never used smokeless tobacco.      Counseling Resources:  ATP IV " Guidelines  Pooled Cohorts Equation Calculator  Breast Cancer Risk Calculator  BRCA-Related Cancer Risk Assessment: FHS-7 Tool  FRAX Risk Assessment  ICSI Preventive Guidelines  Dietary Guidelines for Americans, 2010  USDA's MyPlate  ASA Prophylaxis  Lung CA Screening    TERRY Keith CNP  [facility]

## 2021-05-23 ASSESSMENT — ENCOUNTER SYMPTOMS
SHORTNESS OF BREATH: 0
ABDOMINAL PAIN: 0
PALPITATIONS: 0
PARESTHESIAS: 0
DYSURIA: 0
MYALGIAS: 0
COUGH: 0
DIARRHEA: 0
HEMATURIA: 0
NERVOUS/ANXIOUS: 0
NAUSEA: 0
ARTHRALGIAS: 0
FEVER: 0
HEMATOCHEZIA: 0
FREQUENCY: 0
CHILLS: 0
BREAST MASS: 0
HEADACHES: 1
DIZZINESS: 0
JOINT SWELLING: 0
WEAKNESS: 0
SORE THROAT: 0
HEARTBURN: 0
EYE PAIN: 0
CONSTIPATION: 0

## 2021-05-24 ENCOUNTER — OFFICE VISIT (OUTPATIENT)
Dept: FAMILY MEDICINE | Facility: OTHER | Age: 26
End: 2021-05-24
Payer: COMMERCIAL

## 2021-05-24 VITALS
HEIGHT: 66 IN | TEMPERATURE: 99.1 F | OXYGEN SATURATION: 100 % | WEIGHT: 189.2 LBS | HEART RATE: 102 BPM | SYSTOLIC BLOOD PRESSURE: 108 MMHG | BODY MASS INDEX: 30.41 KG/M2 | DIASTOLIC BLOOD PRESSURE: 68 MMHG | RESPIRATION RATE: 18 BRPM

## 2021-05-24 DIAGNOSIS — Z12.4 SCREENING FOR MALIGNANT NEOPLASM OF CERVIX: ICD-10-CM

## 2021-05-24 DIAGNOSIS — N89.8 VAGINAL DISCHARGE: Primary | ICD-10-CM

## 2021-05-24 DIAGNOSIS — N88.9 CERVICAL LESION: ICD-10-CM

## 2021-05-24 DIAGNOSIS — Z11.3 SCREENING FOR STD (SEXUALLY TRANSMITTED DISEASE): ICD-10-CM

## 2021-05-24 LAB
SPECIMEN SOURCE: NORMAL
WET PREP SPEC: NORMAL

## 2021-05-24 PROCEDURE — 99213 OFFICE O/P EST LOW 20 MIN: CPT | Performed by: NURSE PRACTITIONER

## 2021-05-24 PROCEDURE — G0145 SCR C/V CYTO,THINLAYER,RESCR: HCPCS | Performed by: NURSE PRACTITIONER

## 2021-05-24 PROCEDURE — 87210 SMEAR WET MOUNT SALINE/INK: CPT | Performed by: NURSE PRACTITIONER

## 2021-05-24 PROCEDURE — 87591 N.GONORRHOEAE DNA AMP PROB: CPT | Performed by: NURSE PRACTITIONER

## 2021-05-24 PROCEDURE — 87491 CHLMYD TRACH DNA AMP PROBE: CPT | Performed by: NURSE PRACTITIONER

## 2021-05-24 ASSESSMENT — PAIN SCALES - GENERAL: PAINLEVEL: NO PAIN (0)

## 2021-05-24 ASSESSMENT — MIFFLIN-ST. JEOR: SCORE: 1614.4

## 2021-05-24 NOTE — PROGRESS NOTES
"    Assessment & Plan     Vaginal discharge  - STD testing and pap pending.   - Could be normal discharge   - Wet prep    Cervical lesion  - Small lesion along the right side of cervix near 9:00'oclock.   - Recommend gynecology follow up for evaluation along with pap today.   - IUD strings in place for Copper IUD   She has a visit with a gynecologist scheduled.     Screening for malignant neoplasm of cervix    - Pap imaged thin layer screen reflex to HPV if ASCUS - recommend age 25 - 29    Screening for STD (sexually transmitted disease)    - Neisseria gonorrhoeae PCR  - Chlamydia trachomatis PCR        The patient indicates understanding of these issues and agrees with the plan.    Patient Instructions       No follow-ups on file.    Laura Werner, TERRY CNP  [facility] ELLIOT Gallegos is a 26 year old who presents for the following health issues     HPI     Patient is only here for pap last physical 11/2020    No results found for: PAP      Review of Systems         Objective    /68   Pulse 102   Temp 99.1  F (37.3  C) (Temporal)   Resp 18   Ht 1.668 m (5' 5.65\")   Wt 85.8 kg (189 lb 3.2 oz)   SpO2 100%   BMI 30.86 kg/m    Body mass index is 30.86 kg/m .  Physical Exam  Abdominal:      General: Abdomen is flat. Bowel sounds are normal.      Palpations: Abdomen is soft.   Genitourinary:     Vagina: Normal.      Cervix: Discharge and lesion present. No cervical motion tenderness, friability, erythema or cervical bleeding.      Adnexa: Right adnexa normal and left adnexa normal.      Rectum: Normal.         Neurological:      Mental Status: She is alert.            Results for orders placed or performed in visit on 05/24/21   Wet prep     Status: None    Specimen: Vagina   Result Value Ref Range    Specimen Description Vagina     Wet Prep No Trichomonas seen     Wet Prep No clue cells seen     Wet Prep No yeast seen     Wet Prep Moderate  WBC'S seen      Neisseria " gonorrhoeae PCR     Status: None    Specimen: Urine   Result Value Ref Range    Specimen Descrip Urine     N Gonorrhea PCR Negative NEG^Negative   Chlamydia trachomatis PCR     Status: None    Specimen: Urine   Result Value Ref Range    Specimen Description Urine     Chlamydia Trachomatis PCR Negative NEG^Negative

## 2021-05-26 LAB
COPATH REPORT: NORMAL
PAP: NORMAL

## 2021-06-22 NOTE — RESULT ENCOUNTER NOTE
This result was reviewed with the patient per Dr. Chamberlain.     CURTIS Enriquez 6/22/2021 12:37 PM

## 2021-09-25 ENCOUNTER — HEALTH MAINTENANCE LETTER (OUTPATIENT)
Age: 26
End: 2021-09-25

## 2021-11-18 ENCOUNTER — OFFICE VISIT (OUTPATIENT)
Dept: FAMILY MEDICINE | Facility: CLINIC | Age: 26
End: 2021-11-18
Payer: COMMERCIAL

## 2021-11-18 VITALS
DIASTOLIC BLOOD PRESSURE: 70 MMHG | OXYGEN SATURATION: 100 % | WEIGHT: 188 LBS | BODY MASS INDEX: 30.22 KG/M2 | HEIGHT: 66 IN | HEART RATE: 88 BPM | SYSTOLIC BLOOD PRESSURE: 118 MMHG

## 2021-11-18 DIAGNOSIS — Z30.431 ENCOUNTER FOR MANAGEMENT OF INTRAUTERINE CONTRACEPTIVE DEVICE (IUD), UNSPECIFIED IUD MANAGEMENT TYPE: ICD-10-CM

## 2021-11-18 DIAGNOSIS — R20.2 ARM PARESTHESIA, RIGHT: ICD-10-CM

## 2021-11-18 DIAGNOSIS — Z00.00 ROUTINE ADULT HEALTH MAINTENANCE: Primary | ICD-10-CM

## 2021-11-18 PROCEDURE — 96127 BRIEF EMOTIONAL/BEHAV ASSMT: CPT | Performed by: FAMILY MEDICINE

## 2021-11-18 PROCEDURE — 90686 IIV4 VACC NO PRSV 0.5 ML IM: CPT | Performed by: FAMILY MEDICINE

## 2021-11-18 PROCEDURE — 99213 OFFICE O/P EST LOW 20 MIN: CPT | Mod: 25 | Performed by: FAMILY MEDICINE

## 2021-11-18 PROCEDURE — 90471 IMMUNIZATION ADMIN: CPT | Performed by: FAMILY MEDICINE

## 2021-11-18 PROCEDURE — 99395 PREV VISIT EST AGE 18-39: CPT | Mod: 25 | Performed by: FAMILY MEDICINE

## 2021-11-18 ASSESSMENT — MIFFLIN-ST. JEOR: SCORE: 1606.57

## 2021-11-18 NOTE — PROGRESS NOTES
SUBJECTIVE:   CC: Rosy Davidson is an 26 year old woman who presents for preventive health visit.       Patient has been advised of split billing requirements and indicates understanding: Yes  Healthy Habits:     Getting at least 3 servings of Calcium per day:  Yes    Bi-annual eye exam:  NO    Dental care twice a year:  Yes    Sleep apnea or symptoms of sleep apnea:  None    Diet:  Regular (no restrictions)    Frequency of exercise:  2-3 days/week    Duration of exercise:  15-30 minutes    Taking medications regularly:  Yes    Medication side effects:  None    PHQ-2 Total Score: 0    Additional concerns today:  No    Ability to successfully perform activities of daily living: Yes, no assistance needed  Home safety:  none identified     Today's PHQ-2 Score:   PHQ-2 ( 1999 [employer]) 11/18/2021   Q1: Little interest or pleasure in doing things 0   Q2: Feeling down, depressed or hopeless 0   PHQ-2 Score 0   Q1: Little interest or pleasure in doing things Not at all   Q2: Feeling down, depressed or hopeless Not at all   PHQ-2 Score 0       Abuse: Current or Past (Physical, Sexual or Emotional) - No  Do you feel safe in your environment? Yes    Social History     Tobacco Use     Smoking status: Never Smoker     Smokeless tobacco: Never Used   Substance Use Topics     Alcohol use: Yes     If you drink alcohol do you typically have >3 drinks per day or >7 drinks per week? No    Alcohol Use 11/18/2021   Prescreen: >3 drinks/day or >7 drinks/week? No   Prescreen: >3 drinks/day or >7 drinks/week? -   No flowsheet data found.    Reviewed orders with patient.  Reviewed health maintenance and updated orders accordingly - Yes    Breast Cancer Screening:  Any new diagnosis of family breast, ovarian, or bowel cancer? No    Breast CA Risk Assessment (FHS-7) 5/23/2021   Do you have a family history of breast, colon, or ovarian cancer? No / Unknown       click delete button to remove this line now  Patient under 40 years of age:  "Routine Mammogram Screening not recommended.   Pertinent mammograms are reviewed under the imaging tab.    History of abnormal Pap smear: NO - age 21-29 PAP every 3 years recommended  PAP / HPV 5/24/2021   PAP (Historical) NIL     Reviewed and updated as needed this visit by clinical staff  Tobacco  Allergies  Meds  Problems            Reviewed and updated as needed this visit by Provider    Meds  Problems           No past medical history on file.   No past surgical history on file.    Review of Systems  CONSTITUTIONAL: NEGATIVE for fever, chills, change in weight  INTEGUMENTARU/SKIN: NEGATIVE for worrisome rashes, moles or lesions  EYES: NEGATIVE for vision changes or irritation  ENT: NEGATIVE for ear, mouth and throat problems  RESP: NEGATIVE for significant cough or SOB  BREAST: NEGATIVE for masses, tenderness or discharge  CV: NEGATIVE for chest pain, palpitations or peripheral edema  GI: NEGATIVE for nausea, abdominal pain, heartburn, or change in bowel habits  : NEGATIVE for unusual urinary or vaginal symptoms. Periods are regular.  MUSCULOSKELETAL: NEGATIVE for significant arthralgias or myalgia  NEURO: NEGATIVE for weakness, dizziness or paresthesias  PSYCHIATRIC: NEGATIVE for changes in mood or affect     OBJECTIVE:   /70 (BP Location: Left arm, Patient Position: Sitting, Cuff Size: Adult Regular)   Pulse 88   Ht 1.664 m (5' 5.5\")   Wt 85.3 kg (188 lb)   LMP 10/28/2021   SpO2 100%   BMI 30.81 kg/m    Physical Exam  GENERAL: healthy, alert and no distress  EYES: Eyes grossly normal to inspection, PERRL and conjunctivae and sclerae normal  HENT: ear canals and TM's normal  NECK: no adenopathy, no asymmetry, masses, or scars and thyroid normal to palpation  RESP: lungs clear to auscultation - no rales, rhonchi or wheezes  BREAST: normal without masses, tenderness or nipple discharge and no palpable axillary masses or adenopathy. Scar on left breast  CV: regular rate and rhythm, normal S1 " "S2, no S3 or S4, no murmur, click or rub, no peripheral edema and peripheral pulses strong  ABDOMEN: soft, nontender, no hepatosplenomegaly, no masses and bowel sounds normal  MS: no gross musculoskeletal defects noted, no edema  SKIN: no suspicious lesions or rashes  NEURO: Normal strength and tone, mentation intact and speech normal  PSYCH: mentation appears normal, affect normal/bright    Diagnostic Test Results:  Labs reviewed in Epic    ASSESSMENT/PLAN:   Rosy was seen today for flu shot.    Diagnoses and all orders for this visit:    Routine adult health maintenance  We discussed healthy lifestyle, nutrition, cardiovascular risk reduction, self care, safety, sunscreen, and timing of cancer screening.  Health maintenance screening and immunizations reviewed with the patient.  Follow up yearly for the annual physical.     Arm paresthesia, right  Numbness and pain of right arm on/off for 6 months, worse when laying down at night, with associated neck and upper back symptoms  Suspect cervical radiculopathy  Will refer to spine clinic for further evaluation    Encounter for management of intrauterine contraceptive device (IUD), unspecified IUD management type  Paraguard IUD fell out few months ago  She will schedule an appointment for Paraguard IUD insertion    Other orders  -     WI FLU VAC PRESRV FREE QUAD SPLIT VIR IM  MONTHS IM    Patient has been advised of split billing requirements and indicates understanding: Yes  COUNSELING:  Reviewed preventive health counseling, as reflected in patient instructions    Estimated body mass index is 30.81 kg/m  as calculated from the following:    Height as of this encounter: 1.664 m (5' 5.5\").    Weight as of this encounter: 85.3 kg (188 lb).        She reports that she has never smoked. She has never used smokeless tobacco.      Counseling Resources:  ATP IV Guidelines  Pooled Cohorts Equation Calculator  Breast Cancer Risk Calculator  BRCA-Related Cancer Risk " Assessment: FHS-7 Tool  FRAX Risk Assessment  ICSI Preventive Guidelines  Dietary Guidelines for Americans, 2010  USDA's MyPlate  ASA Prophylaxis  Lung CA Screening    Zoe Newell MD  [facility]

## 2021-12-02 NOTE — PROGRESS NOTES
ASSESSMENT: Rosy Davidson is a 26 year old female who is otherwise healthy who presents today for new patient evaluation of chronic neck pain and headaches.  Pain has been worse over the past 6 months.  She has developed a new type of headache that she describes as a shooting pain with some visual changes.  She also has new pain and numbness in the right upper extremity, worse at night.  A CT soft tissue neck from 2020 showed straightening of normal cervical lordosis but I do not appreciate any significant spinal canal stenosis.  Patient appears to have a significant component of myofascial pain.  There may be a right-sided disc bulge or protrusion affecting the right arm.  Within the differential would be peripheral nerve entrapment such as ulnar neuropathy or carpal tunnel syndrome causing the paresthesias when she lays down.  She describes headache with aura consistent with migraine.  She also describes dizziness consistent with BPPV.    Patient also brings up intermittent right low back/upper buttock pain.  This is likely sacroiliac joint dysfunction.      PLAN:  A shared decision making model was used.  The patient's values and choices were respected.  The following represents what was discussed and decided upon by the physician assistant and the patient.      1.  DIAGNOSTIC TESTS: I reviewed the CT soft tissue neck from 2020.    -I ordered an MRI cervical spine for further evaluation.  -I also ordered an MRI brain given her change in headache characteristic, dizziness.  -If the MRI cervical spine does not show neural impingement to correlate with her symptoms I would recommend an EMG right upper extremity.    2.  PHYSICAL THERAPY:  Discussed the importance of core strengthening, ROM, stretching exercises with the patient and how each of these entities is important in decreasing pain.  Explained to the patient that the purpose of physical therapy is to teach the patient a home exercise program.  These  exercises need to be performed every day in order to decrease pain and prevent future occurrences of pain.   Entered a referral to physical therapy.    3.  MEDICATIONS: No changes are made to the patient's medications.  She takes Tylenol as needed which is helpful.  She does not feel that she needs any additional pain relieving medications at this point.  -If pain were to become more severe we could try cyclobenzaprine 10 mg 3 times daily as needed for the myofascial pain.  -We could also consider gabapentin 300 mg titrating up to 300 g 3 times daily for the arm pain/paresthesias.    4.  INTERVENTIONS: No interventions were ordered.  Patient may benefit from interventional pain management if she fails to improve with conservative treatment, depending on the results of advanced imaging studies.    5.  PATIENT EDUCATION: Patient is in agreement the above plan.  All questions were answered.  -She is going to monitor her low back pain for now.  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  Encouraged her to use better body mechanics and monitor her pain.  If this persist we may need to do additional treatment/evaluation.    6.  FOLLOW-UP:   I will send the patient a [employer] message with the results of her MRI cervical spine and brain.  At that time I will likely recommend that she trial physical therapy and follow-up with me after 4 weeks versus return to the clinic sooner to discuss treatment options.  If she has questions or concerns in the meantime, she should not hesitate to call.        SUBJECTIVE:  Rosy Davidson  Is a 26 year old female who presents today in consultation at the request of Dr. Hattie Newell For new patient evaluation of neck pain, headaches, right upper extremity pain and paresthesias.  Patient reports that she has had a chronic neck pain and headaches for years.  She states that her pain has been worse over the past 6 months.  She denies any injury or event to  cause the pain.  Over the past 6 months she has had some changes to her chronic pain.  She states that typically she gets a visual aura followed by a migraine headache.  She has a new type of pain in her head which she describes as a shooting pain that causes some visual changes.  She has also developed pain and numbness radiating down the right arm.    Patient complains of right greater than left neck pain.  Pain extends into the right shoulder blade area.  When she lays down at night she gets pain and numbness/tingling that extends down the arm to the wrist.  She does not feel like it affects the hand/fingers.  She has to reposition and massage her arm in order to alleviate the pain and paresthesias.  Stretching also helps alleviate the pain.  She states that the muscles in her neck always feel very tight.  She has been having intermittent dizziness when she rolls over in bed or gets up from bed.  She denies weakness.  Denies any recent fevers, chills, sweats.  Denies any loss of bowel or bladder control.    Patient went to 2 sessions of chiropractic treatment which were not helpful.  She tried massage which provided temporary relief of her pain.  She has not had physical therapy.  She has not had any spine surgeries or spine injections.  She takes Tylenol as needed which is helpful.    Medications: Tylenol as needed.      No Known Allergies        Patient Active Problem List   Diagnosis     Raynaud's phenomenon     Headache(784.0)       No past surgical history on file.    Family history: None    Social history: Patient is single.  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  She drinks 1 alcoholic beverage per week.  She has used marijuana occasionally in the past.  Denies additional illicit drug use.    ROS: Positive for headache, palpitations, enlarged lymph nodes (chronic, sees ENT), joint pain, muscle pain, dizziness.  Specifically negative for dysphagia, imbalance, fine motor skill difficulties,  bowel/bladder dysfunction, fevers,chills, appetite changes, unexplained weight loss.   Otherwise 13 systems reviewed are negative.  Please see the patient's intake questionnaire from today for details.      OBJECTIVE:  PHYSICAL EXAMINATION:  CONSTITUTIONAL:  Vital signs as above.  No acute distress.  The patient is well nourished and well groomed.  PSYCHIATRIC:  The patient is awake, alert, oriented to person, place, time and answering questions appropriately with clear speech.    HEENT:  Normocephalic, atraumatic.  Sclera clear.    SKIN:  Skin over the face, bilateral upper extremities, and neck is clean, dry, intact without rashes.  LYMPH NODES: Enlarged lymph nodes right posterior cervical chain.  They are nontender and mobile.  MUSCLE STRENGTH:  5/5 strength for the bilateral shoulder abductors, elbow flexors/extensors, wrist extensors, finger flexors/abductors.  NEURO:  CN III-XII are grossly intact.  2+ symmetric biceps, brachioradialis, triceps reflexes bilaterally.  Sensation to light touch intact bilateral upper extremities throughout.  Negative Cardenas's bilaterally.  Negative Tinel's sign bilateral carpal tunnel and bilateral cubital tunnel.  Negative Phalen's sign bilaterally.  VASCULAR:  2/4 radial pulses bilaterally.  Warm upper limbs bilaterally.  Capillary refill in the upper extremities is less than 1 second.  MUSCULOSKELETAL: Tender to palpation right upper trapezius muscle with hypertonicity.  No significant tenderness palpation bilateral cervical paraspinous muscles.  Cervical range of motion is full.    RESULTS: I reviewed the CT soft tissue neck from [facility] dated November 23, 2020.  This shows straightening of normal cervical lordosis.  I do not appreciate any significant spinal canal stenosis.

## 2021-12-03 ENCOUNTER — OFFICE VISIT (OUTPATIENT)
Dept: PHYSICAL MEDICINE AND REHAB | Facility: CLINIC | Age: 26
End: 2021-12-03
Attending: FAMILY MEDICINE
Payer: COMMERCIAL

## 2021-12-03 VITALS
SYSTOLIC BLOOD PRESSURE: 130 MMHG | DIASTOLIC BLOOD PRESSURE: 72 MMHG | HEIGHT: 66 IN | BODY MASS INDEX: 30.37 KG/M2 | WEIGHT: 189 LBS | HEART RATE: 101 BPM

## 2021-12-03 DIAGNOSIS — R20.2 ARM PARESTHESIA, RIGHT: ICD-10-CM

## 2021-12-03 DIAGNOSIS — M54.2 CERVICALGIA: Primary | ICD-10-CM

## 2021-12-03 DIAGNOSIS — R51.9 CHRONIC NONINTRACTABLE HEADACHE, UNSPECIFIED HEADACHE TYPE: ICD-10-CM

## 2021-12-03 DIAGNOSIS — G89.29 CHRONIC NONINTRACTABLE HEADACHE, UNSPECIFIED HEADACHE TYPE: ICD-10-CM

## 2021-12-03 DIAGNOSIS — R42 DIZZINESS: ICD-10-CM

## 2021-12-03 PROCEDURE — 99214 OFFICE O/P EST MOD 30 MIN: CPT | Performed by: PHYSICIAN ASSISTANT

## 2021-12-03 ASSESSMENT — MIFFLIN-ST. JEOR: SCORE: 1611.11

## 2021-12-03 ASSESSMENT — PAIN SCALES - GENERAL: PAINLEVEL: MODERATE PAIN (4)

## 2021-12-03 NOTE — LETTER
12/3/2021         RE: Rosy Davidson  [address]  [place] [address]        Dear Colleague,    Thank you for referring your patient, Rosy Davidson, to the [facility] [place]. Please see a copy of my visit note below.    ASSESSMENT: Rosy Davidson is a 26 year old female who is otherwise healthy who presents today for new patient evaluation of chronic neck pain and headaches.  Pain has been worse over the past 6 months.  She has developed a new type of headache that she describes as a shooting pain with some visual changes.  She also has new pain and numbness in the right upper extremity, worse at night.  A CT soft tissue neck from 2020 showed straightening of normal cervical lordosis but I do not appreciate any significant spinal canal stenosis.  Patient appears to have a significant component of myofascial pain.  There may be a right-sided disc bulge or protrusion affecting the right arm.  Within the differential would be peripheral nerve entrapment such as ulnar neuropathy or carpal tunnel syndrome causing the paresthesias when she lays down.  She describes headache with aura consistent with migraine.  She also describes dizziness consistent with BPPV.    Patient also brings up intermittent right low back/upper buttock pain.  This is likely sacroiliac joint dysfunction.      PLAN:  A shared decision making model was used.  The patient's values and choices were respected.  The following represents what was discussed and decided upon by the physician assistant and the patient.      1.  DIAGNOSTIC TESTS: I reviewed the CT soft tissue neck from 2020.    -I ordered an MRI cervical spine for further evaluation.  -I also ordered an MRI brain given her change in headache characteristic, dizziness.  -If the MRI cervical spine does not show neural impingement to correlate with her symptoms I would recommend an EMG right upper extremity.    2.  PHYSICAL THERAPY:  Discussed  the importance of core strengthening, ROM, stretching exercises with the patient and how each of these entities is important in decreasing pain.  Explained to the patient that the purpose of physical therapy is to teach the patient a home exercise program.  These exercises need to be performed every day in order to decrease pain and prevent future occurrences of pain.   Entered a referral to physical therapy.    3.  MEDICATIONS: No changes are made to the patient's medications.  She takes Tylenol as needed which is helpful.  She does not feel that she needs any additional pain relieving medications at this point.  -If pain were to become more severe we could try cyclobenzaprine 10 mg 3 times daily as needed for the myofascial pain.  -We could also consider gabapentin 300 mg titrating up to 300 g 3 times daily for the arm pain/paresthesias.    4.  INTERVENTIONS: No interventions were ordered.  Patient may benefit from interventional pain management if she fails to improve with conservative treatment, depending on the results of advanced imaging studies.    5.  PATIENT EDUCATION: Patient is in agreement the above plan.  All questions were answered.  -She is going to monitor her low back pain for now.  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  Encouraged her to use better body mechanics and monitor her pain.  If this persist we may need to do additional treatment/evaluation.    6.  FOLLOW-UP:   I will send the patient a [employer] message with the results of her MRI cervical spine and brain.  At that time I will likely recommend that she trial physical therapy and follow-up with me after 4 weeks versus return to the clinic sooner to discuss treatment options.  If she has questions or concerns in the meantime, she should not hesitate to call.        SUBJECTIVE:  Rosy Davidson  Is a 26 year old female who presents today in consultation at the request of Dr. Hattie Newell For new  patient evaluation of neck pain, headaches, right upper extremity pain and paresthesias.  Patient reports that she has had a chronic neck pain and headaches for years.  She states that her pain has been worse over the past 6 months.  She denies any injury or event to cause the pain.  Over the past 6 months she has had some changes to her chronic pain.  She states that typically she gets a visual aura followed by a migraine headache.  She has a new type of pain in her head which she describes as a shooting pain that causes some visual changes.  She has also developed pain and numbness radiating down the right arm.    Patient complains of right greater than left neck pain.  Pain extends into the right shoulder blade area.  When she lays down at night she gets pain and numbness/tingling that extends down the arm to the wrist.  She does not feel like it affects the hand/fingers.  She has to reposition and massage her arm in order to alleviate the pain and paresthesias.  Stretching also helps alleviate the pain.  She states that the muscles in her neck always feel very tight.  She has been having intermittent dizziness when she rolls over in bed or gets up from bed.  She denies weakness.  Denies any recent fevers, chills, sweats.  Denies any loss of bowel or bladder control.    Patient went to 2 sessions of chiropractic treatment which were not helpful.  She tried massage which provided temporary relief of her pain.  She has not had physical therapy.  She has not had any spine surgeries or spine injections.  She takes Tylenol as needed which is helpful.    Medications: Tylenol as needed.      No Known Allergies        Patient Active Problem List   Diagnosis     Raynaud's phenomenon     Headache(784.0)       No past surgical history on file.    Family history: None    Social history: Patient is single.  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  She drinks 1 alcoholic beverage per week.  She has used  marijuana occasionally in the past.  Denies additional illicit drug use.    ROS: Positive for headache, palpitations, enlarged lymph nodes (chronic, sees ENT), joint pain, muscle pain, dizziness.  Specifically negative for dysphagia, imbalance, fine motor skill difficulties, bowel/bladder dysfunction, fevers,chills, appetite changes, unexplained weight loss.   Otherwise 13 systems reviewed are negative.  Please see the patient's intake questionnaire from today for details.      OBJECTIVE:  PHYSICAL EXAMINATION:  CONSTITUTIONAL:  Vital signs as above.  No acute distress.  The patient is well nourished and well groomed.  PSYCHIATRIC:  The patient is awake, alert, oriented to person, place, time and answering questions appropriately with clear speech.    HEENT:  Normocephalic, atraumatic.  Sclera clear.    SKIN:  Skin over the face, bilateral upper extremities, and neck is clean, dry, intact without rashes.  LYMPH NODES: Enlarged lymph nodes right posterior cervical chain.  They are nontender and mobile.  MUSCLE STRENGTH:  5/5 strength for the bilateral shoulder abductors, elbow flexors/extensors, wrist extensors, finger flexors/abductors.  NEURO:  CN III-XII are grossly intact.  2+ symmetric biceps, brachioradialis, triceps reflexes bilaterally.  Sensation to light touch intact bilateral upper extremities throughout.  Negative Cardenas's bilaterally.  Negative Tinel's sign bilateral carpal tunnel and bilateral cubital tunnel.  Negative Phalen's sign bilaterally.  VASCULAR:  2/4 radial pulses bilaterally.  Warm upper limbs bilaterally.  Capillary refill in the upper extremities is less than 1 second.  MUSCULOSKELETAL: Tender to palpation right upper trapezius muscle with hypertonicity.  No significant tenderness palpation bilateral cervical paraspinous muscles.  Cervical range of motion is full.    RESULTS: I reviewed the CT soft tissue neck from [facility] dated November 23, 2020.  This shows straightening of  normal cervical lordosis.  I do not appreciate any significant spinal canal stenosis.        Again, thank you for allowing me to participate in the care of your patient.        Sincerely,        Marie Jane PA-C

## 2021-12-03 NOTE — PATIENT INSTRUCTIONS
[facility] Scheduling    Please call [phone] to schedule your image(s) (select option#1). There are  different locations, see below. .     [facility]  [address] [address]      [address] [address]

## 2021-12-27 ENCOUNTER — HOSPITAL ENCOUNTER (OUTPATIENT)
Dept: MRI IMAGING | Facility: CLINIC | Age: 26
End: 2021-12-27
Attending: PHYSICIAN ASSISTANT
Payer: COMMERCIAL

## 2021-12-27 DIAGNOSIS — R20.2 ARM PARESTHESIA, RIGHT: ICD-10-CM

## 2021-12-27 DIAGNOSIS — R42 DIZZINESS: ICD-10-CM

## 2021-12-27 DIAGNOSIS — R51.9 CHRONIC NONINTRACTABLE HEADACHE, UNSPECIFIED HEADACHE TYPE: ICD-10-CM

## 2021-12-27 DIAGNOSIS — M54.2 CERVICALGIA: ICD-10-CM

## 2021-12-27 DIAGNOSIS — G89.29 CHRONIC NONINTRACTABLE HEADACHE, UNSPECIFIED HEADACHE TYPE: ICD-10-CM

## 2021-12-27 PROCEDURE — 70551 MRI BRAIN STEM W/O DYE: CPT

## 2021-12-27 PROCEDURE — 72141 MRI NECK SPINE W/O DYE: CPT

## 2022-05-12 NOTE — PROGRESS NOTES
Assessment:   Rosy Davidson is a 27 year old y.o. female who is otherwise healthy who presents today for follow-up regarding acute right low back/upper buttock pain with radiation into the right groin and intermittent paresthesias right toe 2.  Pain is most consistent with right sacroiliac joint dysfunction plus myofascial pain involving the right piriformis muscle.  She had reproduction of pain with SI joint provocative maneuvers x2 on the right.  She was neurologically intact on exam.  - This patient is also known to me for right upper extremity pain and numbness.  An MRI cervical spine did not show any right-sided neural compromise.  Within the differential is trigger point from a right parascapular muscle versus peripheral nerve entrapment.       Plan:     A shared decision making plan was used.  The patient's values and choices were respected.  The following represents what was discussed and decided upon by the physician assistant and the patient.      1.  DIAGNOSTIC TESTS:    -I ordered an x-ray of the right pelvis and hip.  - She may need an MRI lumbar spine if symptoms fail to improve.  - I also reviewed the MRI cervical spine.  - I offered an EMG right upper extremity but she declined for now.    2.  PHYSICAL THERAPY:   I entered a referral to physical therapy.  I asked him to address both herUpper and lower extremity symptoms.    3.  MEDICATIONS: Patient has been taking Tylenol as needed which is somewhat helpful.  I suggest that she also try taking ibuprofen as needed.    4.  INTERVENTIONS: I recommended that the patient see one of our DO's for consideration for osteopathic manipulation which can be very helpful for pelvic joint dysfunction.  - If symptoms fail to improve with physical therapy/manipulation/NSAIDs, we could consider interventional pain management which would be a right sacroiliac joint injection versus right piriformis muscle trigger point injection.    5.  PATIENT EDUCATION: Patient is  in agreement the above plan.  All questions were answered.    6.  FOLLOW-UP: I will send the patient a [employer] message with her x-ray results.  We will see how she does with physical therapy and manipulation.  She should follow-up with me if she is not making progress.  Patient is leaving for a trip to [place] next month and would like some relief before she leaves.    Subjective:     Rosy Davidson is a 27 year old female who presents today for follow-up regarding acute right low back/upper buttock pain with radiation into the right groin.  Pain began 2 weeks ago.  She denies any injury or event to cause the pain.  Pain is getting worse.  This patient is previously known to me for right upper extremity pain and paresthesias.  She states this is actually improved since I last saw her but it is still present.  She feels like there is a spot under the right shoulder blade that triggers the right upper extremity symptoms.    Patient complains of right buttock pain.  She points to both the SI joint and piriformis area when asked to point to the pain.  She feels the pain radiate up into the right lower lumbar region.  Pain also radiates into the right groin.  She denies pain further down the leg.  She notices a numb sensation in right toe #2 only when she is walking.  She rates her pain today as a 4-10.  At its worst it is a 7 out of 10.  At its best it is a 0-10.  Pain is aggravated with sitting.  Pain is alleviated with sitting and leaning to the left to offload the weight off the right side.  Stretching massage also help to alleviate the pain.  She denies weakness.  Denies loss of bowel or bladder control.  Denies recent fevers.  Denies chance of pregnancy.    Patient has not had physical therapy.  She is taking Tylenol as needed which is somewhat helpful.    Review of Systems:  Positive for numbness/tingling, headache.  Negative for weakness, loss of bowel/bladder control, inability to urinate, pain much worse at  night, trip/stumble/falls, difficulty swallowing, difficulty with hand skills, fevers, unintentional weight loss.     Objective:   CONSTITUTIONAL:  Vital signs as above.  No acute distress.  The patient is well nourished and well groomed.    PSYCHIATRIC:  The patient is awake, alert, oriented to person, place and time.  The patient is answering questions appropriately with clear speech.  Normal affect.  HEENT: Normocephalic, atraumatic.  Sclera clear.    SKIN:  Skin over the face, posterior torso, bilateral upper and lower extremities is clean, dry, intact without rashes.  VASCULAR: No significant lower extremity edema.  MUSCULOSKELETAL:  Gait is non-antalgic.  The patient is able to heel and toe walk without any difficulty.  Mild tenderness palpation right sacroiliac joint and right piriformis muscle.  No significant tenderness palpation bilateral lower lumbar paraspinous muscles.  Lumbar flexion and extension are intact.  The patient has 5/5 strength for the bilateral hip flexors, knee flexors/extensors, ankle dorsiflexors/plantar flexors, ankle evertors/invertors.  Negative pelvic distraction test.  Positive thigh thrust right, negative thigh thrust left.  Negative Aniket's test bilaterally.  Negative Gaenslen's test bilaterally.  Positive Yeomans test right, negative Yeomans test left.  NEUROLOGICAL: 2+ patellar, achilles reflexes which are symmetric bilaterally.  No ankle clonus bilaterally.  Sensation to light touch is intact in the bilateral L4, L5, and S1 dermatomes.       RESULTS: I reviewed the MRI cervical spine from [facility] dated December 27, 2021.  This shows no abnormal cord signal.  There is no neural compromise.    MRI brain December 27, 2021 was normal

## 2022-05-13 ENCOUNTER — HOSPITAL ENCOUNTER (OUTPATIENT)
Dept: RADIOLOGY | Facility: HOSPITAL | Age: 27
Discharge: HOME | End: 2022-05-13
Attending: PHYSICIAN ASSISTANT | Admitting: PHYSICIAN ASSISTANT
Payer: COMMERCIAL

## 2022-05-13 ENCOUNTER — OFFICE VISIT (OUTPATIENT)
Dept: PHYSICAL MEDICINE AND REHAB | Facility: CLINIC | Age: 27
End: 2022-05-13
Payer: COMMERCIAL

## 2022-05-13 VITALS
TEMPERATURE: 97.8 F | WEIGHT: 194.8 LBS | HEART RATE: 86 BPM | SYSTOLIC BLOOD PRESSURE: 135 MMHG | BODY MASS INDEX: 31.31 KG/M2 | HEIGHT: 66 IN | DIASTOLIC BLOOD PRESSURE: 76 MMHG

## 2022-05-13 DIAGNOSIS — M79.601 PAIN OF RIGHT UPPER EXTREMITY: ICD-10-CM

## 2022-05-13 DIAGNOSIS — M53.3 PAIN OF RIGHT SACROILIAC JOINT: ICD-10-CM

## 2022-05-13 DIAGNOSIS — M53.3 PAIN OF RIGHT SACROILIAC JOINT: Primary | ICD-10-CM

## 2022-05-13 DIAGNOSIS — M79.18 MYOFASCIAL PAIN: ICD-10-CM

## 2022-05-13 PROCEDURE — 99214 OFFICE O/P EST MOD 30 MIN: CPT | Performed by: PHYSICIAN ASSISTANT

## 2022-05-13 PROCEDURE — 73502 X-RAY EXAM HIP UNI 2-3 VIEWS: CPT

## 2022-05-13 ASSESSMENT — PAIN SCALES - GENERAL: PAINLEVEL: MODERATE PAIN (4)

## 2022-05-13 NOTE — LETTER
5/13/2022         RE: Rosy Davidson  [address]  [place] [address]        Dear Colleague,    Thank you for referring your patient, Rosy Davidson, to the [facility]UR[facility]ERY. Please see a copy of my visit note below.    Assessment:   Rosy Davidson is a 27 year old y.o. female who is otherwise healthy who presents today for follow-up regarding acute right low back/upper buttock pain with radiation into the right groin and intermittent paresthesias right toe 2.  Pain is most consistent with right sacroiliac joint dysfunction plus myofascial pain involving the right piriformis muscle.  She had reproduction of pain with SI joint provocative maneuvers x2 on the right.  She was neurologically intact on exam.  - This patient is also known to me for right upper extremity pain and numbness.  An MRI cervical spine did not show any right-sided neural compromise.  Within the differential is trigger point from a right parascapular muscle versus peripheral nerve entrapment.       Plan:     A shared decision making plan was used.  The patient's values and choices were respected.  The following represents what was discussed and decided upon by the physician assistant and the patient.      1.  DIAGNOSTIC TESTS:    -I ordered an x-ray of the right pelvis and hip.  - She may need an MRI lumbar spine if symptoms fail to improve.  - I also reviewed the MRI cervical spine.  - I offered an EMG right upper extremity but she declined for now.    2.  PHYSICAL THERAPY:   I entered a referral to physical therapy.  I asked him to address both herUpper and lower extremity symptoms.    3.  MEDICATIONS: Patient has been taking Tylenol as needed which is somewhat helpful.  I suggest that she also try taking ibuprofen as needed.    4.  INTERVENTIONS: I recommended that the patient see one of our DO's for consideration for osteopathic manipulation which can be very helpful for pelvic joint  dysfunction.  - If symptoms fail to improve with physical therapy/manipulation/NSAIDs, we could consider interventional pain management which would be a right sacroiliac joint injection versus right piriformis muscle trigger point injection.    5.  PATIENT EDUCATION: Patient is in agreement the above plan.  All questions were answered.    6.  FOLLOW-UP: I will send the patient a [employer] message with her x-ray results.  We will see how she does with physical therapy and manipulation.  She should follow-up with me if she is not making progress.  Patient is leaving for a trip to [place] next month and would like some relief before she leaves.    Subjective:     Rosy Davidson is a 27 year old female who presents today for follow-up regarding acute right low back/upper buttock pain with radiation into the right groin.  Pain began 2 weeks ago.  She denies any injury or event to cause the pain.  Pain is getting worse.  This patient is previously known to me for right upper extremity pain and paresthesias.  She states this is actually improved since I last saw her but it is still present.  She feels like there is a spot under the right shoulder blade that triggers the right upper extremity symptoms.    Patient complains of right buttock pain.  She points to both the SI joint and piriformis area when asked to point to the pain.  She feels the pain radiate up into the right lower lumbar region.  Pain also radiates into the right groin.  She denies pain further down the leg.  She notices a numb sensation in right toe #2 only when she is walking.  She rates her pain today as a 4-10.  At its worst it is a 7 out of 10.  At its best it is a 0-10.  Pain is aggravated with sitting.  Pain is alleviated with sitting and leaning to the left to offload the weight off the right side.  Stretching massage also help to alleviate the pain.  She denies weakness.  Denies loss of bowel or bladder control.  Denies recent fevers.  Denies chance  of pregnancy.    Patient has not had physical therapy.  She is taking Tylenol as needed which is somewhat helpful.    Review of Systems:  Positive for numbness/tingling, headache.  Negative for weakness, loss of bowel/bladder control, inability to urinate, pain much worse at night, trip/stumble/falls, difficulty swallowing, difficulty with hand skills, fevers, unintentional weight loss.     Objective:   CONSTITUTIONAL:  Vital signs as above.  No acute distress.  The patient is well nourished and well groomed.    PSYCHIATRIC:  The patient is awake, alert, oriented to person, place and time.  The patient is answering questions appropriately with clear speech.  Normal affect.  HEENT: Normocephalic, atraumatic.  Sclera clear.    SKIN:  Skin over the face, posterior torso, bilateral upper and lower extremities is clean, dry, intact without rashes.  VASCULAR: No significant lower extremity edema.  MUSCULOSKELETAL:  Gait is non-antalgic.  The patient is able to heel and toe walk without any difficulty.  Mild tenderness palpation right sacroiliac joint and right piriformis muscle.  No significant tenderness palpation bilateral lower lumbar paraspinous muscles.  Lumbar flexion and extension are intact.  The patient has 5/5 strength for the bilateral hip flexors, knee flexors/extensors, ankle dorsiflexors/plantar flexors, ankle evertors/invertors.  Negative pelvic distraction test.  Positive thigh thrust right, negative thigh thrust left.  Negative Aniket's test bilaterally.  Negative Gaenslen's test bilaterally.  Positive Yeomans test right, negative Yeomans test left.  NEUROLOGICAL: 2+ patellar, achilles reflexes which are symmetric bilaterally.  No ankle clonus bilaterally.  Sensation to light touch is intact in the bilateral L4, L5, and S1 dermatomes.       RESULTS: I reviewed the MRI cervical spine from [facility] dated December 27, 2021.  This shows no abnormal cord signal.  There is no neural compromise.    MRI brain  December 27, 2021 was normal            Again, thank you for allowing me to participate in the care of your patient.        Sincerely,        Marie Jane PA-C

## 2022-05-13 NOTE — PATIENT INSTRUCTIONS
I believe you are having pain from the right sacroiliac joint and the right piriformis muscle.  Physical therapy and manipulation typically provide significant relief for this pain.  I have ordered an x-ray of the pelvis and right hip to evaluate.    [facility] Scheduling    Please call [phone] to schedule your image(s) (select option#1). There are 2 different locations, see below. You can do walk-in visits for xray only images if you want.     [facility]  [address] [place] [address]    An order for physicaltherapy has been provided today.  Someone will call you to schedule physical therapy or you can call [phone] to schedule physical therapy.  It will be very important for you to do your physical therapy exercises on aregular basis to decrease your pain and prevent future flares of pain.

## 2022-05-17 ENCOUNTER — OFFICE VISIT (OUTPATIENT)
Dept: PHYSICAL MEDICINE AND REHAB | Facility: CLINIC | Age: 27
End: 2022-05-17
Payer: COMMERCIAL

## 2022-05-17 VITALS — HEART RATE: 97 BPM | SYSTOLIC BLOOD PRESSURE: 138 MMHG | DIASTOLIC BLOOD PRESSURE: 78 MMHG | OXYGEN SATURATION: 99 %

## 2022-05-17 DIAGNOSIS — M99.04 SOMATIC DYSFUNCTION OF SACRAL REGION: ICD-10-CM

## 2022-05-17 DIAGNOSIS — M79.601 PAIN OF RIGHT UPPER EXTREMITY: ICD-10-CM

## 2022-05-17 DIAGNOSIS — M99.00 SOMATIC DYSFUNCTION OF HEAD REGION: ICD-10-CM

## 2022-05-17 DIAGNOSIS — M99.08 SOMATIC DYSFUNCTION OF RIB CAGE REGION: ICD-10-CM

## 2022-05-17 DIAGNOSIS — M79.18 MYOFASCIAL PAIN: ICD-10-CM

## 2022-05-17 DIAGNOSIS — M99.02 SOMATIC DYSFUNCTION OF SPINE, THORACIC: ICD-10-CM

## 2022-05-17 DIAGNOSIS — M99.01 SOMATIC DYSFUNCTION OF SPINE, CERVICAL: ICD-10-CM

## 2022-05-17 DIAGNOSIS — M53.3 PAIN OF RIGHT SACROILIAC JOINT: Primary | ICD-10-CM

## 2022-05-17 DIAGNOSIS — M99.03 SOMATIC DYSFUNCTION OF SPINE, LUMBAR: ICD-10-CM

## 2022-05-17 DIAGNOSIS — M99.05 SOMATIC DYSFUNCTION OF PELVIC REGION: ICD-10-CM

## 2022-05-17 DIAGNOSIS — M54.2 CERVICALGIA: ICD-10-CM

## 2022-05-17 PROCEDURE — 98928 OSTEOPATH MANJ 7-8 REGIONS: CPT | Performed by: PAIN MEDICINE

## 2022-05-17 PROCEDURE — 99214 OFFICE O/P EST MOD 30 MIN: CPT | Mod: 25 | Performed by: PAIN MEDICINE

## 2022-05-17 RX ORDER — NAPROXEN 500 MG/1
500 TABLET ORAL 2 TIMES DAILY WITH MEALS
Qty: 60 TABLET | Refills: 1 | Status: SHIPPED | OUTPATIENT
Start: 2022-05-17

## 2022-05-17 ASSESSMENT — PAIN SCALES - GENERAL: PAINLEVEL: SEVERE PAIN (7)

## 2022-05-17 NOTE — PATIENT INSTRUCTIONS
Osteopathic manipulation is performed today.  You could feel a sense of soreness like a workout type soreness.  Please drink plenty water today.  Follow-up visit in 1 week.    I have ordered naproxen 500 mg twice daily as needed.  Please take this with food.  Please do not take ibuprofen with this.  You may take up to 1000 mg of acetaminophen 3 times daily.    ~Please call Nurse Navigation line [phone] with any questions or concerns about your treatment plan, if symptoms worsen and you would like to be seen urgently, or if you have problems controlling bladder and bowel function.

## 2022-05-17 NOTE — LETTER
5/17/2022         RE: Rosy Davidson  [address]  [place] [address]        Dear Colleague,    Thank you for referring your patient, Rosy Davidson, to the [facility] SPINE [facility] NEUROSURGERY. Please see a copy of my visit note below.      Assessment:     Diagnoses and all orders for this visit:  Pain of right sacroiliac joint  -     OSTEOPATHIC MANIP,7-8 BODY REGN  -     naproxen (NAPROSYN) 500 MG tablet; Take 1 tablet (500 mg) by mouth 2 times daily (with meals)  Myofascial pain  -     OSTEOPATHIC MANIP,7-8 BODY REGN  -     naproxen (NAPROSYN) 500 MG tablet; Take 1 tablet (500 mg) by mouth 2 times daily (with meals)  Cervicalgia  -     OSTEOPATHIC MANIP,7-8 BODY REGN  -     naproxen (NAPROSYN) 500 MG tablet; Take 1 tablet (500 mg) by mouth 2 times daily (with meals)  Pain of right upper extremity  -     OSTEOPATHIC MANIP,7-8 BODY REGN  -     naproxen (NAPROSYN) 500 MG tablet; Take 1 tablet (500 mg) by mouth 2 times daily (with meals)  Somatic dysfunction of pelvic region  -     OSTEOPATHIC MANIP,7-8 BODY REGN  Somatic dysfunction of sacral region  -     OSTEOPATHIC MANIP,7-8 BODY REGN  Somatic dysfunction of spine, lumbar  -     OSTEOPATHIC MANIP,7-8 BODY REGN  Somatic dysfunction of spine, thoracic  -     OSTEOPATHIC MANIP,7-8 BODY REGN  Somatic dysfunction of spine, cervical  -     OSTEOPATHIC MANIP,7-8 BODY REGN  Somatic dysfunction of rib cage region  -     OSTEOPATHIC MANIP,7-8 BODY REGN  Somatic dysfunction of head region  -     OSTEOPATHIC MANIP,7-8 BODY REGN     Rosy Davidson is a 27 year old y.o. female otherwise healthy who presents today for follow-up regarding right low back and buttock pain:    -Patient has right low back and buttock pain likely secondary to sacral back pain versus lumbar radiculopathy.     PSP: Marie Jane  Plan:     A shared decision making plan was used. The patient's values and choices were respected. Prior medical records from Marie Jane's last  visit on 5/13/2022 were reviewed today. The following represents what was discussed and decided upon by the provider and the patient.        -DIAGNOSTIC TESTS: Images were personally reviewed and interpreted.   -- X-ray of pelvis and hips dated 5/13/2022 is personally viewed images interpreted and discussed with the patient.  There is no evidence of sacroiliitis, ankylosis or fracture.  -- Could consider MRI of the lumbar spine should pain continue despite osteopathic manipulation.    -INTERVENTIONS: Osteopathic manipulation is performed today.  Please see the procedure note below.    -MEDICATIONS: I ordered naproxen 500 mg twice daily as needed.  She should take this in place of ibuprofen with food.  -  Discussed side effects of medications and proper use. Patient verbalized understanding.    -PHYSICAL THERAPY: Recommend that she continue with physical therapy.    Discussed the importance of core strengthening, ROM, stretching exercises with the patient and how each of these entities is important in decreasing pain.  Explained to the patient that the purpose of physical therapy is to teach the patient a home exercise program.  These exercises need to be performed every day in order to decrease pain and prevent future occurrences of pain.        -PATIENT EDUCATION: We discussed pain management in a multimodal fashion including physical therapy, medication management, possible future injections.    -FOLLOW UP: Patient will follow up in 1 week for OMT.  Advised to contact clinic if symptoms worsen or change.    Subjective:     Rosy Davidson is a 27 year old female who presents today for follow-up regarding severe right low back and buttock pain.  Patient notes that for 2 and half weeks she has had worsening pain in the low back and buttock area.  This is worse with bending as well as moving and sitting.  It is improved with laying down.  She has been taking Advil and Tylenol with some relief.  Her pain today is  7/10 at its best is 5/10 at its worst is 9/10.  She occasionally feels shooting pain down her right lower extremity.  She also has some mid back pain and right upper extremity pain has been going on for a while.  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  She is hopeful that she can get some relief with osteopathic manipulation for her shift tonight.    Evaluation to Date: X-ray of pelvis dated 5/13/2022.    Treatment to Date: 1 treatment of OMT.    Patient Active Problem List   Diagnosis     Raynaud's phenomenon     Headache(784.0)       Current Outpatient Medications   Medication     naproxen (NAPROSYN) 500 MG tablet     No current facility-administered medications for this visit.       No Known Allergies    No past medical history on file.     Review of Systems  ROS:  Specifically negative for bowel/bladder dysfunction, balance changes, headache, dizziness, foot drop, fevers, chills, appetite changes, nausea/vomiting, unexplained weight loss. Otherwise 13 systems reviewed are negative. Please see the patient's intake questionnaire from today for details.    Reviewed Social, Family, Past Medical and Past Surgical history with patient, no significant changes noted since prior visit.     Objective:     /78   Pulse 97   SpO2 99%     PHYSICAL EXAMINATION:    --CONSTITUTIONAL: Well developed, well nourished, healthy appearing individual.  --PSYCHIATRIC: Appropriate mood and affect. No difficulty interacting due to temper, social withdrawal, or memory issues.  --RESPIRATORY: Normal rhythm and effort. No abnormal accessory muscle breathing patterns noted.   --MUSCULOSKELETAL:  Normal lumbar lordosis noted, no lateral shift.  --GROSS MOTOR: Easily arises from a seated position. Gait is non-antalgic  --LUMBAR SPINE:  Inspection reveals no evidence of deformity. Range of motion is mildly limited in lumbar flexion, extension, lateral rotation.  Tenderness palpation over the right low back and buttock area.  Straight leg raising in the seated position is negative to radicular pain.    --SACROILIAC JOINT:  One Finger point test positive on the right.  --LOWER EXTREMITY MOTOR TESTING:  Plantar flexion left 5/5, right 5/5   Dorsiflexion left 5/5, right 5/5   Great toe MTP extension left 5/5, right 5/5  Knee flexion left 5/5, right 5/5  Knee extension left 5/5, right 5/5   Hip flexion left 5/5, right 5/5  Hip abduction left 5/5, right 5/5  Hip adduction left 5/5, right 5/5   --HIPS: Full range of motion bilaterally.  --NEUROLOGIC:  Sensation to light touch is intact in the bilateral L4, L5, and S1 dermatomes.    RESULTS:   Imaging: Lumbar spine imaging was reviewed today. The images were shown to the patient and the findings were explained using a spine model.    XR Pelvis and Hip Right 2 Views    Result Date: 5/13/2022  EXAM: XR PELVIS AND HIP RIGHT 2 VIEWS LOCATION: [facility] DATE/TIME: 5/13/2022 10:02 AM INDICATION: Pain of right sacroiliac joint. COMPARISON: None.     IMPRESSION: Both hips negative for fracture or dislocation. Pelvis negative for fracture. No evidence for sacroiliitis, ankylosis, or diastasis. IUD.    OSTEOPATHIC STRUCTURAL EXAM:  Head: OA is rotated left side bent right  Cervical: C3-4 rotated left side bent left  Ribs: Elevated first right rib  Thoracic: T5-6 rotated right side bent left  Lumbar: L4-5 rotated right side bent left  Pelvis: Superior right pelvic shear  Sacrum: Rotated right    OMT was performed today.  The patient tolerated the procedure well.  Patient was instructed to drink plenty of water.  As discussed that the patient could have increased soreness after the treatment, soreness like after a workout.    OMT:  TREATMENTS IN PARENTHESES  Head: Muscle energy  Cervical: Muscle energy and myofascial release  Ribs: Muscle energy  Thoracic: Muscle energy  Lumbar: Articulatory  Pelvis: Articulatory and muscle energy  Sacrum: Muscle energy                             Again, thank you for allowing me to participate in the care of your patient.        Sincerely,        Kt Barillas, DO

## 2022-05-17 NOTE — PROGRESS NOTES
Assessment:     Diagnoses and all orders for this visit:  Pain of right sacroiliac joint  -     OSTEOPATHIC MANIP,7-8 BODY REGN  -     naproxen (NAPROSYN) 500 MG tablet; Take 1 tablet (500 mg) by mouth 2 times daily (with meals)  Myofascial pain  -     OSTEOPATHIC MANIP,7-8 BODY REGN  -     naproxen (NAPROSYN) 500 MG tablet; Take 1 tablet (500 mg) by mouth 2 times daily (with meals)  Cervicalgia  -     OSTEOPATHIC MANIP,7-8 BODY REGN  -     naproxen (NAPROSYN) 500 MG tablet; Take 1 tablet (500 mg) by mouth 2 times daily (with meals)  Pain of right upper extremity  -     OSTEOPATHIC MANIP,7-8 BODY REGN  -     naproxen (NAPROSYN) 500 MG tablet; Take 1 tablet (500 mg) by mouth 2 times daily (with meals)  Somatic dysfunction of pelvic region  -     OSTEOPATHIC MANIP,7-8 BODY REGN  Somatic dysfunction of sacral region  -     OSTEOPATHIC MANIP,7-8 BODY REGN  Somatic dysfunction of spine, lumbar  -     OSTEOPATHIC MANIP,7-8 BODY REGN  Somatic dysfunction of spine, thoracic  -     OSTEOPATHIC MANIP,7-8 BODY REGN  Somatic dysfunction of spine, cervical  -     OSTEOPATHIC MANIP,7-8 BODY REGN  Somatic dysfunction of rib cage region  -     OSTEOPATHIC MANIP,7-8 BODY REGN  Somatic dysfunction of head region  -     OSTEOPATHIC MANIP,7-8 BODY REGN     Rosy Davidson is a 27 year old y.o. female otherwise healthy who presents today for follow-up regarding right low back and buttock pain:    -Patient has right low back and buttock pain likely secondary to sacral back pain versus lumbar radiculopathy.     PSP: Marie Jane  Plan:     A shared decision making plan was used. The patient's values and choices were respected. Prior medical records from Marie Jane's last visit on 5/13/2022 were reviewed today. The following represents what was discussed and decided upon by the provider and the patient.        -DIAGNOSTIC TESTS: Images were personally reviewed and interpreted.   -- X-ray of pelvis and hips dated 5/13/2022 is personally  viewed images interpreted and discussed with the patient.  There is no evidence of sacroiliitis, ankylosis or fracture.  -- Could consider MRI of the lumbar spine should pain continue despite osteopathic manipulation.    -INTERVENTIONS: Osteopathic manipulation is performed today.  Please see the procedure note below.    -MEDICATIONS: I ordered naproxen 500 mg twice daily as needed.  She should take this in place of ibuprofen with food.  -  Discussed side effects of medications and proper use. Patient verbalized understanding.    -PHYSICAL THERAPY: Recommend that she continue with physical therapy.    Discussed the importance of core strengthening, ROM, stretching exercises with the patient and how each of these entities is important in decreasing pain.  Explained to the patient that the purpose of physical therapy is to teach the patient a home exercise program.  These exercises need to be performed every day in order to decrease pain and prevent future occurrences of pain.        -PATIENT EDUCATION: We discussed pain management in a multimodal fashion including physical therapy, medication management, possible future injections.    -FOLLOW UP: Patient will follow up in 1 week for OMT.  Advised to contact clinic if symptoms worsen or change.    Subjective:     Rosy Davidson is a 27 year old female who presents today for follow-up regarding severe right low back and buttock pain.  Patient notes that for 2 and half weeks she has had worsening pain in the low back and buttock area.  This is worse with bending as well as moving and sitting.  It is improved with laying down.  She has been taking Advil and Tylenol with some relief.  Her pain today is 7/10 at its best is 5/10 at its worst is 9/10.  She occasionally feels shooting pain down her right lower extremity.  She also has some mid back pain and right upper extremity pain has been going on for a while.  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  She is  hopeful that she can get some relief with osteopathic manipulation for her shift tonight.    Evaluation to Date: X-ray of pelvis dated 5/13/2022.    Treatment to Date: 1 treatment of OMT.    Patient Active Problem List   Diagnosis     Raynaud's phenomenon     Headache(784.0)       Current Outpatient Medications   Medication     naproxen (NAPROSYN) 500 MG tablet     No current facility-administered medications for this visit.       No Known Allergies    No past medical history on file.     Review of Systems  ROS:  Specifically negative for bowel/bladder dysfunction, balance changes, headache, dizziness, foot drop, fevers, chills, appetite changes, nausea/vomiting, unexplained weight loss. Otherwise 13 systems reviewed are negative. Please see the patient's intake questionnaire from today for details.    Reviewed Social, Family, Past Medical and Past Surgical history with patient, no significant changes noted since prior visit.     Objective:     /78   Pulse 97   SpO2 99%     PHYSICAL EXAMINATION:    --CONSTITUTIONAL: Well developed, well nourished, healthy appearing individual.  --PSYCHIATRIC: Appropriate mood and affect. No difficulty interacting due to temper, social withdrawal, or memory issues.  --RESPIRATORY: Normal rhythm and effort. No abnormal accessory muscle breathing patterns noted.   --MUSCULOSKELETAL:  Normal lumbar lordosis noted, no lateral shift.  --GROSS MOTOR: Easily arises from a seated position. Gait is non-antalgic  --LUMBAR SPINE:  Inspection reveals no evidence of deformity. Range of motion is mildly limited in lumbar flexion, extension, lateral rotation.  Tenderness palpation over the right low back and buttock area. Straight leg raising in the seated position is negative to radicular pain.    --SACROILIAC JOINT:  One Finger point test positive on the right.  --LOWER EXTREMITY MOTOR TESTING:  Plantar flexion left 5/5, right 5/5   Dorsiflexion left 5/5, right 5/5   Great toe MTP  extension left 5/5, right 5/5  Knee flexion left 5/5, right 5/5  Knee extension left 5/5, right 5/5   Hip flexion left 5/5, right 5/5  Hip abduction left 5/5, right 5/5  Hip adduction left 5/5, right 5/5   --HIPS: Full range of motion bilaterally.  --NEUROLOGIC:  Sensation to light touch is intact in the bilateral L4, L5, and S1 dermatomes.    RESULTS:   Imaging: Lumbar spine imaging was reviewed today. The images were shown to the patient and the findings were explained using a spine model.    XR Pelvis and Hip Right 2 Views    Result Date: 5/13/2022  EXAM: XR PELVIS AND HIP RIGHT 2 VIEWS LOCATION: [facility] DATE/TIME: 5/13/2022 10:02 AM INDICATION: Pain of right sacroiliac joint. COMPARISON: None.     IMPRESSION: Both hips negative for fracture or dislocation. Pelvis negative for fracture. No evidence for sacroiliitis, ankylosis, or diastasis. IUD.    OSTEOPATHIC STRUCTURAL EXAM:  Head: OA is rotated left side bent right  Cervical: C3-4 rotated left side bent left  Ribs: Elevated first right rib  Thoracic: T5-6 rotated right side bent left  Lumbar: L4-5 rotated right side bent left  Pelvis: Superior right pelvic shear  Sacrum: Rotated right    OMT was performed today.  The patient tolerated the procedure well.  Patient was instructed to drink plenty of water.  As discussed that the patient could have increased soreness after the treatment, soreness like after a workout.    OMT:  TREATMENTS IN PARENTHESES  Head: Muscle energy  Cervical: Muscle energy and myofascial release  Ribs: Muscle energy  Thoracic: Muscle energy  Lumbar: Articulatory  Pelvis: Articulatory and muscle energy  Sacrum: Muscle energy

## 2022-05-25 ENCOUNTER — OFFICE VISIT (OUTPATIENT)
Dept: PHYSICAL MEDICINE AND REHAB | Facility: CLINIC | Age: 27
End: 2022-05-25
Payer: COMMERCIAL

## 2022-05-25 VITALS — DIASTOLIC BLOOD PRESSURE: 70 MMHG | HEART RATE: 132 BPM | SYSTOLIC BLOOD PRESSURE: 123 MMHG | OXYGEN SATURATION: 100 %

## 2022-05-25 DIAGNOSIS — M99.02 SOMATIC DYSFUNCTION OF SPINE, THORACIC: ICD-10-CM

## 2022-05-25 DIAGNOSIS — M79.18 MYOFASCIAL PAIN: ICD-10-CM

## 2022-05-25 DIAGNOSIS — M99.08 SOMATIC DYSFUNCTION OF RIB CAGE REGION: ICD-10-CM

## 2022-05-25 DIAGNOSIS — M54.2 CERVICALGIA: ICD-10-CM

## 2022-05-25 DIAGNOSIS — M99.03 SOMATIC DYSFUNCTION OF SPINE, LUMBAR: ICD-10-CM

## 2022-05-25 DIAGNOSIS — M99.05 SOMATIC DYSFUNCTION OF PELVIC REGION: ICD-10-CM

## 2022-05-25 DIAGNOSIS — M99.00 SOMATIC DYSFUNCTION OF HEAD REGION: ICD-10-CM

## 2022-05-25 DIAGNOSIS — M99.01 SOMATIC DYSFUNCTION OF SPINE, CERVICAL: ICD-10-CM

## 2022-05-25 DIAGNOSIS — M53.3 PAIN OF RIGHT SACROILIAC JOINT: Primary | ICD-10-CM

## 2022-05-25 DIAGNOSIS — M79.601 PAIN OF RIGHT UPPER EXTREMITY: ICD-10-CM

## 2022-05-25 DIAGNOSIS — M99.04 SOMATIC DYSFUNCTION OF SACRAL REGION: ICD-10-CM

## 2022-05-25 PROCEDURE — 98928 OSTEOPATH MANJ 7-8 REGIONS: CPT | Performed by: PAIN MEDICINE

## 2022-05-25 ASSESSMENT — PAIN SCALES - GENERAL: PAINLEVEL: MILD PAIN (3)

## 2022-05-25 NOTE — PROGRESS NOTES
Assessment:     Diagnoses and all orders for this visit:  Pain of right sacroiliac joint  -     OSTEOPATHIC MANIP,7-8 BODY REGN  Myofascial pain  -     OSTEOPATHIC MANIP,7-8 BODY REGN  Cervicalgia  -     OSTEOPATHIC MANIP,7-8 BODY REGN  Pain of right upper extremity  -     OSTEOPATHIC MANIP,7-8 BODY REGN  Somatic dysfunction of pelvic region  -     OSTEOPATHIC MANIP,7-8 BODY REGN  Somatic dysfunction of sacral region  -     OSTEOPATHIC MANIP,7-8 BODY REGN  Somatic dysfunction of spine, cervical  -     OSTEOPATHIC MANIP,7-8 BODY REGN  Somatic dysfunction of spine, thoracic  -     OSTEOPATHIC MANIP,7-8 BODY REGN  Somatic dysfunction of spine, lumbar  -     OSTEOPATHIC MANIP,7-8 BODY REGN  Somatic dysfunction of rib cage region  -     OSTEOPATHIC MANIP,7-8 BODY REGN  Somatic dysfunction of head region  -     OSTEOPATHIC MANIP,7-8 BODY REGN     oRsy Davidson is a 27 year old y.o. female otherwise healthy who presents today for follow-up regarding right low back and buttock pain:    -Patient has seen improvement of pain.  She would like another treatment of osteopathic manipulation.     PSP: Marie Jane  Plan:     A shared decision making plan was used. The patient's values and choices were respected. Prior medical records from our last visit on 5/17/2022 were reviewed today. The following represents what was discussed and decided upon by the provider and the patient.        -DIAGNOSTIC TESTS: Images were personally reviewed and interpreted.   -- X-ray of pelvis and hips dated 5/13/2022 is personally viewed images interpreted and discussed with the patient.  There is no evidence of sacroiliitis, ankylosis or fracture.  -- Could consider MRI of the lumbar spine should pain continue despite osteopathic manipulation.    -INTERVENTIONS: Osteopathic manipulation was performed today.  Please see the procedure note below.    -MEDICATIONS: No changes to medications.  -  Discussed side effects of medications and proper use.  Patient verbalized understanding.    -PHYSICAL THERAPY: Recommend that she continue with home exercises.    Discussed the importance of core strengthening, ROM, stretching exercises with the patient and how each of these entities is important in decreasing pain.  Explained to the patient that the purpose of physical therapy is to teach the patient a home exercise program.  These exercises need to be performed every day in order to decrease pain and prevent future occurrences of pain.        -PATIENT EDUCATION: We discussed pain management in a multimodal fashion including physical therapy, medication management, possible future injections.    -FOLLOW UP: Patient will follow up in 1 week for OMT.  Advised to contact clinic if symptoms worsen or change.    Subjective:     Rosy Davidson is a 27 year old female who presents today for follow-up regarding neck and low back pain.  Patient notes that she is feeling better than she was.  She is able to sit longer without having as much pain.  This is very important to her as she will be traveling to [place] in about a week and a half.  Her pain is worse with sitting as well as bending over a patient's bed.  Mostly on the right low back.  Pain is improved with laying down.  Her pain today is 3/10 worst is 5/10 is best a 0/10.  She is currently taking naproxen which she feels with her pain.  Pain is achy in nature now.  Is not sharp or longer.  She denies any bowel or bladder changes, fevers, chills, unintentional weight loss.    Evaluation to Date: X-ray of pelvis dated 5/13/2022.     Treatment to Date: 2 treatment of OMT.    Patient Active Problem List   Diagnosis     Raynaud's phenomenon     Headache(784.0)       Current Outpatient Medications   Medication     naproxen (NAPROSYN) 500 MG tablet     No current facility-administered medications for this visit.       No Known Allergies    No past medical history on file.     Review of Systems  ROS:  Specifically negative for  bowel/bladder dysfunction, balance changes, headache, dizziness, foot drop, fevers, chills, appetite changes, nausea/vomiting, unexplained weight loss. Otherwise 13 systems reviewed are negative. Please see the patient's intake questionnaire from today for details.    Reviewed Social, Family, Past Medical and Past Surgical history with patient, no significant changes noted since prior visit.     Objective:     /70   Pulse (!) 132   SpO2 100%     PHYSICAL EXAMINATION:    --CONSTITUTIONAL: Well developed, well nourished, healthy appearing individual.  --PSYCHIATRIC: Appropriate mood and affect. No difficulty interacting due to temper, social withdrawal, or memory issues.  --RESPIRATORY: Normal rhythm and effort. No abnormal accessory muscle breathing patterns noted.   --MUSCULOSKELETAL:  Normal lumbar lordosis noted, no lateral shift.  --GROSS MOTOR: Easily arises from a seated position. Gait is non-antalgic  --LUMBAR SPINE:  Inspection reveals no evidence of deformity. Range of motion is not limited in lumbar flexion, extension, lateral rotation.  Tenderness palpation over the right greater than left low back. --SACROILIAC JOINT: One Finger point test positive on the right.  --LOWER EXTREMITY MOTOR TESTING:  Plantar flexion left 5/5, right 5/5   Dorsiflexion left 5/5, right 5/5   Great toe MTP extension left 5/5, right 5/5  Knee flexion left 5/5, right 5/5  Knee extension left 5/5, right 5/5   Hip flexion left 5/5, right 5/5  Hip abduction left 5/5, right 5/5  Hip adduction left 5/5, right 5/5   --HIPS: Full range of motion bilaterally.   --NEUROLOGIC:  Sensation to light touch is intact in the bilateral L4, L5, and S1 dermatomes.    RESULTS:   Imaging: Lumbar spine imaging was reviewed today. The images were shown to the patient and the findings were explained using a spine model.    XR Pelvis and Hip Right 2 Views    Result Date: 5/13/2022  EXAM: XR PELVIS AND HIP RIGHT 2 VIEWS LOCATION: [facility] DATE/TIME: 5/13/2022 10:02 AM INDICATION: Pain of right sacroiliac joint. COMPARISON: None.     IMPRESSION: Both hips negative for fracture or dislocation. Pelvis negative for fracture. No evidence for sacroiliitis, ankylosis, or diastasis. IUD.    OSTEOPATHIC STRUCTURAL EXAM:  Head: OA is rotated left side bent right  Cervical: C3-4 rotated left side bent left  Ribs: Elevated first right rib  Thoracic: T5-6 rotated right side bent left  Lumbar: L4-5 rotated right side bent left  Pelvis: Superior left pelvic shear  Sacrum: Rotated left    OMT was performed today.  The patient tolerated the procedure well.  Patient was instructed to drink plenty of water.  As discussed that the patient could have increased soreness after the treatment, soreness like after a workout.    OMT:  TREATMENTS IN PARENTHESES  Head: Muscle energy  Cervical: Muscle energy and myofascial release  Ribs: Muscle energy  Thoracic: Muscle energy  Lumbar: Articulatory  Pelvis: Articulatory and muscle energy  Sacrum: Muscle energy

## 2022-05-25 NOTE — PATIENT INSTRUCTIONS
Osteopathic manipulation is performed today.  You could feel a sense of soreness like a workout type soreness.  Please drink plenty water today.    ~Please call Nurse Navigation line [phone] with any questions or concerns about your treatment plan, if symptoms worsen and you would like to be seen urgently, or if you have problems controlling bladder and bowel function.

## 2023-04-22 ENCOUNTER — HEALTH MAINTENANCE LETTER (OUTPATIENT)
Age: 28
End: 2023-04-22

## 2023-05-05 NOTE — LETTER
11/30/2020         RE: Rosy Davidson  [id number] [address] [place] [id number]        Dear Colleague,    Thank you for referring your patient, Rosy Davidson, to the [facility]. Please see a copy of my visit note below.    History of Present Illness - Rosy Davidson is a 25 year old female presents for evaluation of asymmetric tonsils.  She certainly does have 1 enlarged right upper pole of the tonsil on the left side.  Otherwise the tonsils appear to be clear.  No exudates no other issues been noted.  CT scan of the soft tissues of the neck was obtained to further evaluate tonsillar bed status of lymphadenopathy in the neck to help us in the decision-making process in regard to further steps selection to address the asymmetry.  Results of CT scan:    CT OF THE NECK WITH CONTRAST  11/23/2020 10:41 AM      COMPARISON: None     HISTORY: Neck mass, solitary, afebrile.  Asymmetric tonsils.  Lymphadenopathy.     TECHNIQUE:  Axial CT images of the neck were acquired after the  intravenous administration of 80mL Isovue 370 nonionic iodinated  contrast material. Coronal reconstructions were created.     FINDINGS: There is mild prominence in size of the palatine tonsils  bilaterally (right more prominent than left). There is no definite  evidence for intrinsic abnormality of any of the lymphoid tissues of  Waldeyer's ring. If there is clinical suspicion of a small mucosal  space lesion, direct visualization is recommended.     There are scattered mildly prominent anterior and posterior cervical  chain lymph nodes in both sides of the neck that could be reactive in  nature.     There are no fluid collections or abscesses in the neck. The salivary  glands are unremarkable. Other than mild prominence of the lymphoid  tissues of Waldeyer's ring, no mucosal space abnormalities  are  identified. The thyroid gland is within normal limits. The lung apices  are clear. There is no sinusitis or  Please get bivalent COVID vaccine ASAP.  Next shingrix 2-6 months.   mastoiditis. The cervical arterial  vasculature is within normal limits.                                                                      IMPRESSION:  1. Scattered mildly prominent lymph nodes in the anterior and  posterior cervical chains at both sides of the neck that could be  reactive in nature.  2. Mild prominence of the lymphoid tissues of Waldeyer's ring with  asymmetric prominence in size of the right palatine tonsil.  3. Otherwise, normal soft tissue neck CT.       Past Medical History - No past medical history on file.    Current Medications -   Current Outpatient Medications:      paragard intrauterine copper device, 1 each by Intrauterine route once, Disp: , Rfl:      Pseudoephedrine-APAP-DM (DAYQUIL OR), , Disp: , Rfl:     Allergies - No Known Allergies    Social History -   Social History     Socioeconomic History     Marital status: Single     Spouse name: Not on file     Number of children: Not on file     Years of education: Not on file     Highest education level: Not on file   Occupational History     Not on file   Social Needs     Financial resource strain: Not on file     Food insecurity     Worry: Not on file     Inability: Not on file     Transportation needs     Medical: Not on file     Non-medical: Not on file   Tobacco Use     Smoking status: Never Smoker     Smokeless tobacco: Never Used   Substance and Sexual Activity     Alcohol use: Yes     Drug use: Never     Sexual activity: Not on file   Lifestyle     Physical activity     Days per week: Not on file     Minutes per session: Not on file     Stress: Not on file   Relationships     Social connections     Talks on phone: Not on file     Gets together: Not on file     Attends Scientologist service: Not on file     Active member of club or organization: Not on file     Attends meetings of clubs or organizations: Not on file     Relationship status: Not on file     Intimate partner violence     Fear of current or ex partner: Not on file      Emotionally abused: Not on file     Physically abused: Not on file     Forced sexual activity: Not on file   Other Topics Concern     Not on file   Social History Narrative     Not on file       Family History - No family history on file.    Review of Systems - As per HPI and PMHx, otherwise review of system review of the head and neck negative. Otherwise 10+ review systems negative.    Physical Exam  There were no vitals taken for this visit.  BMI: There is no height or weight on file to calculate BMI.    General - The patient is well nourished and well developed, and appears to have good nutritional status.  Alert and oriented to person and place, answers questions and cooperates with examination appropriately.    SKIN - No suspicious lesions or rashes.  Respiration - No respiratory distress.  Head and Face - Normocephalic and atraumatic, with no gross asymmetry noted of the contour of the facial features.  The facial nerve is intact, with strong symmetric movements.    Voice and Breathing - The patient was breathing comfortably without the use of accessory muscles. The patients voice was clear and strong, and had appropriate pitch and quality.    Ears - Bilateral pinna and EACs with normal appearing overlying skin. Tympanic membrane intact with good mobility on pneumatic otoscopy bilaterally. Bony landmarks of the ossicular chain are normal. The tympanic membranes are normal in appearance. No retraction, perforation, or masses.  No fluid or purulence was seen in the external canal or the middle ear.     Eyes - Extraocular movements intact.  Sclera were not icteric or injected, conjunctiva were pink and moist.    Mouth - Examination of the oral cavity showed pink, healthy oral mucosa. No lesions or ulcerations noted.  The tongue was mobile and midline, and the dentition were in good condition.      Throat - The walls of the oropharynx were smooth, pink, moist, symmetric, and had no lesions or ulcerations.  The  "tonsillar pillars and soft palate were symmetric. Tonsils are   3+ on the right mainly due to the prominent upper pole and 2+ on the left without exudates without any other suspicious mucosal changes.. The uvula was midline on elevation.    Neck - Normal midline excursion of the laryngotracheal complex during swallowing.  Full range of motion on passive movement.  Palpation of the occipital, submental, submandibular, internal jugular chain, and supraclavicular nodes did not demonstrate any abnormal lymph nodes or masses.  The carotid pulse was palpable bilaterally.  Palpation of the thyroid was soft and smooth, with no nodules or goiter appreciated.  The trachea was mobile and midline.    Nose - External contour is symmetric, no gross deflection or scars.  Nasal mucosa is pink and moist with no abnormal mucus.  The septum was midline and non-obstructive, turbinates of normal size and position.  No polyps, masses, or purulence noted on examination.    Neuro - Nonfocal neuro exam is normal, CN 2 through 12 intact, normal gait and muscle tone.      Performed in clinic today:  No procedures preformed in clinic today          A/P - Rosysandy Davidson is a 25 year old female Patient presents with:  Follow Up: tonsils        Today we have a thorough discussion of the patient in regard to findings on the CT scan current status of her tonsils.  She understands the small chance of the asymmetry being significance.  She understands that the CT scan did not appear to show any suspicious findings.  At this point she wishes to observe this carefully but conservatively.  Considering the asymmetry presents no symptomatology to her we agree with this decision for now.  She is instructed that if there are any changes whatsoever she perceives discomfort foreign body sensation hemoptysis she is to return right away.  If she feels any other \"lumps\" in the neck she is to come back right away otherwise we will follow-up in 3 " months.      Serge Chamberlain MD          Again, thank you for allowing me to participate in the care of your patient.        Sincerely,        Serge Chamberlain MD, MD

## 2024-11-16 ENCOUNTER — HEALTH MAINTENANCE LETTER (OUTPATIENT)
Age: 29
End: 2024-11-16